# Patient Record
Sex: MALE | Race: WHITE | NOT HISPANIC OR LATINO
[De-identification: names, ages, dates, MRNs, and addresses within clinical notes are randomized per-mention and may not be internally consistent; named-entity substitution may affect disease eponyms.]

---

## 2017-07-14 ENCOUNTER — TRANSCRIPTION ENCOUNTER (OUTPATIENT)
Age: 32
End: 2017-07-14

## 2017-11-08 ENCOUNTER — TRANSCRIPTION ENCOUNTER (OUTPATIENT)
Age: 32
End: 2017-11-08

## 2019-06-17 ENCOUNTER — EMERGENCY (EMERGENCY)
Facility: HOSPITAL | Age: 34
LOS: 1 days | Discharge: HOME | End: 2019-06-17
Attending: EMERGENCY MEDICINE
Payer: COMMERCIAL

## 2019-06-17 VITALS
HEIGHT: 68 IN | HEART RATE: 81 BPM | TEMPERATURE: 98 F | SYSTOLIC BLOOD PRESSURE: 146 MMHG | RESPIRATION RATE: 19 BRPM | WEIGHT: 195.11 LBS | OXYGEN SATURATION: 98 % | DIASTOLIC BLOOD PRESSURE: 110 MMHG

## 2019-06-17 DIAGNOSIS — Z98.890 OTHER SPECIFIED POSTPROCEDURAL STATES: Chronic | ICD-10-CM

## 2019-06-17 PROCEDURE — 99283 EMERGENCY DEPT VISIT LOW MDM: CPT

## 2019-06-17 NOTE — ED ADULT NURSE NOTE - OBJECTIVE STATEMENT
pt states, "I was picking something up by the parking meter and I stepped backwards onto uneven pavement and my left foot twisted and I started to have pain in my calf and behind my knee. I went to an orthopedist and they told me to come to the ED" pt ambulatory with steady gait at present."  pt has full ROM in all extremities at present. pt denies any fall, hitting his head, LOC, chest pain, SOB, numbness/tingling at present.

## 2019-06-17 NOTE — ED ADULT TRIAGE NOTE - CHIEF COMPLAINT QUOTE
s/p trip over crack in pavement, felt pain in L lower back radiating thigh --> L calf, seen by orthopedist, sent to ED for doppler, +L calf swelling, +ambulatory

## 2019-06-17 NOTE — ED ADULT NURSE NOTE - NSIMPLEMENTINTERV_GEN_ALL_ED
Implemented All Universal Safety Interventions:  [place] to call system. Call bell, personal items and telephone within reach. Instruct patient to call for assistance. Room bathroom lighting operational. Non-slip footwear when patient is off stretcher. Physically safe environment: no spills, clutter or unnecessary equipment. Stretcher in lowest position, wheels locked, appropriate side rails in place.

## 2019-06-17 NOTE — ED PROVIDER NOTE - MUSCULOSKELETAL, MLM
No midline spinal tenderness.  Non tender over left SI joint.  Left hamdstrain and calf with mild tenderness.  joint on LLE full ROM NT throughout.  Negative Moon's test LLE.

## 2019-06-17 NOTE — ED PROVIDER NOTE - OBJECTIVE STATEMENT
34 y.o. male coming in with left posterior leg pain.  Pt was at work, lifted something heavy and stepped back with her left foot.  Heel went in the ground and he hyperextended his left foot pointing his toes to the lizzy.  Did not fall to the ground, no other injury to mention.  Initially had some pain in the LLB but that has since resolved.  Currently with pain over the left hamstring and calf.  Saw an orthopedist today that recommended he come to the ER for possible dopplers.  Pain is worse with ambulation and better with rest.

## 2019-06-17 NOTE — ED PROVIDER NOTE - NSFOLLOWUPINSTRUCTIONS_ED_ALL_ED_FT
1- Motrin 600 mg every 6 hours for pain  2- Follow up with your orthopedist and for your MRI tomorrow  3- Return to ER for any new or worsening symptoms  4- Weight bearing as tolerated  5- Rest Ice and Elevation this evening and tomorrow.

## 2019-06-17 NOTE — ED PROVIDER NOTE - NSFOLLOWUPCLINICS_GEN_ALL_ED_FT
[facility]  Sports Medicine  [address], [address]  [place] [address]  Phone: [phone]  Fax:   Follow Up Time:

## 2019-06-17 NOTE — ED ADULT NURSE REASSESSMENT NOTE - NS ED NURSE REASSESS COMMENT FT1
pt sent to be registered with rep. pt not returned to pt bed and no longer in ED. No IV in place at present. pt AOx3 VSS at present. ER JOSE Grewal made aware.

## 2020-03-21 ENCOUNTER — TRANSCRIPTION ENCOUNTER (OUTPATIENT)
Age: 35
End: 2020-03-21

## 2021-07-28 ENCOUNTER — TRANSCRIPTION ENCOUNTER (OUTPATIENT)
Age: 36
End: 2021-07-28

## 2021-09-14 ENCOUNTER — TRANSCRIPTION ENCOUNTER (OUTPATIENT)
Age: 36
End: 2021-09-14

## 2021-09-20 ENCOUNTER — TRANSCRIPTION ENCOUNTER (OUTPATIENT)
Age: 36
End: 2021-09-20

## 2021-09-21 ENCOUNTER — TRANSCRIPTION ENCOUNTER (OUTPATIENT)
Age: 36
End: 2021-09-21

## 2021-09-27 ENCOUNTER — TRANSCRIPTION ENCOUNTER (OUTPATIENT)
Age: 36
End: 2021-09-27

## 2021-09-28 ENCOUNTER — TRANSCRIPTION ENCOUNTER (OUTPATIENT)
Age: 36
End: 2021-09-28

## 2021-10-05 ENCOUNTER — TRANSCRIPTION ENCOUNTER (OUTPATIENT)
Age: 36
End: 2021-10-05

## 2021-10-12 ENCOUNTER — TRANSCRIPTION ENCOUNTER (OUTPATIENT)
Age: 36
End: 2021-10-12

## 2021-10-13 ENCOUNTER — TRANSCRIPTION ENCOUNTER (OUTPATIENT)
Age: 36
End: 2021-10-13

## 2021-10-18 ENCOUNTER — TRANSCRIPTION ENCOUNTER (OUTPATIENT)
Age: 36
End: 2021-10-18

## 2021-10-19 ENCOUNTER — TRANSCRIPTION ENCOUNTER (OUTPATIENT)
Age: 36
End: 2021-10-19

## 2021-10-26 ENCOUNTER — TRANSCRIPTION ENCOUNTER (OUTPATIENT)
Age: 36
End: 2021-10-26

## 2021-11-01 ENCOUNTER — TRANSCRIPTION ENCOUNTER (OUTPATIENT)
Age: 36
End: 2021-11-01

## 2021-11-02 ENCOUNTER — TRANSCRIPTION ENCOUNTER (OUTPATIENT)
Age: 36
End: 2021-11-02

## 2021-11-08 ENCOUNTER — TRANSCRIPTION ENCOUNTER (OUTPATIENT)
Age: 36
End: 2021-11-08

## 2021-12-12 ENCOUNTER — EMERGENCY (EMERGENCY)
Facility: HOSPITAL | Age: 36
LOS: 1 days | Discharge: HOME | End: 2021-12-12
Attending: EMERGENCY MEDICINE | Admitting: EMERGENCY MEDICINE
Payer: COMMERCIAL

## 2021-12-12 VITALS
OXYGEN SATURATION: 99 % | DIASTOLIC BLOOD PRESSURE: 77 MMHG | SYSTOLIC BLOOD PRESSURE: 128 MMHG | HEART RATE: 56 BPM | RESPIRATION RATE: 14 BRPM | TEMPERATURE: 98 F

## 2021-12-12 VITALS
HEIGHT: 68 IN | DIASTOLIC BLOOD PRESSURE: 103 MMHG | RESPIRATION RATE: 18 BRPM | SYSTOLIC BLOOD PRESSURE: 155 MMHG | OXYGEN SATURATION: 100 % | HEART RATE: 58 BPM | WEIGHT: 199.96 LBS | TEMPERATURE: 97 F

## 2021-12-12 DIAGNOSIS — Z98.890 OTHER SPECIFIED POSTPROCEDURAL STATES: Chronic | ICD-10-CM

## 2021-12-12 LAB
ALBUMIN SERPL ELPH-MCNC: 3.9 G/DL — SIGNIFICANT CHANGE UP (ref 3.3–5)
ALP SERPL-CCNC: 54 U/L — SIGNIFICANT CHANGE UP (ref 40–120)
ALT FLD-CCNC: 88 U/L — HIGH (ref 10–45)
ANION GAP SERPL CALC-SCNC: 9 MMOL/L — SIGNIFICANT CHANGE UP (ref 5–17)
APTT BLD: 35.5 SEC — SIGNIFICANT CHANGE UP (ref 27.5–35.5)
AST SERPL-CCNC: 26 U/L — SIGNIFICANT CHANGE UP (ref 10–40)
BASOPHILS # BLD AUTO: 0.03 K/UL — SIGNIFICANT CHANGE UP (ref 0–0.2)
BASOPHILS NFR BLD AUTO: 0.3 % — SIGNIFICANT CHANGE UP (ref 0–2)
BILIRUB SERPL-MCNC: 0.3 MG/DL — SIGNIFICANT CHANGE UP (ref 0.2–1.2)
BUN SERPL-MCNC: 18 MG/DL — SIGNIFICANT CHANGE UP (ref 7–23)
CALCIUM SERPL-MCNC: 8.6 MG/DL — SIGNIFICANT CHANGE UP (ref 8.4–10.5)
CHLORIDE SERPL-SCNC: 106 MMOL/L — SIGNIFICANT CHANGE UP (ref 96–108)
CO2 SERPL-SCNC: 27 MMOL/L — SIGNIFICANT CHANGE UP (ref 22–31)
CREAT SERPL-MCNC: 0.98 MG/DL — SIGNIFICANT CHANGE UP (ref 0.5–1.3)
EOSINOPHIL # BLD AUTO: 0.31 K/UL — SIGNIFICANT CHANGE UP (ref 0–0.5)
EOSINOPHIL NFR BLD AUTO: 3.6 % — SIGNIFICANT CHANGE UP (ref 0–6)
GLUCOSE SERPL-MCNC: 85 MG/DL — SIGNIFICANT CHANGE UP (ref 70–99)
HCT VFR BLD CALC: 44.8 % — SIGNIFICANT CHANGE UP (ref 39–50)
HGB BLD-MCNC: 15.5 G/DL — SIGNIFICANT CHANGE UP (ref 13–17)
IMM GRANULOCYTES NFR BLD AUTO: 0.3 % — SIGNIFICANT CHANGE UP (ref 0–1.5)
INR BLD: 1.03 RATIO — SIGNIFICANT CHANGE UP (ref 0.88–1.16)
LIDOCAIN IGE QN: 105 U/L — SIGNIFICANT CHANGE UP (ref 73–393)
LYMPHOCYTES # BLD AUTO: 4.03 K/UL — HIGH (ref 1–3.3)
LYMPHOCYTES # BLD AUTO: 46.8 % — HIGH (ref 13–44)
MCHC RBC-ENTMCNC: 31 PG — SIGNIFICANT CHANGE UP (ref 27–34)
MCHC RBC-ENTMCNC: 34.6 GM/DL — SIGNIFICANT CHANGE UP (ref 32–36)
MCV RBC AUTO: 89.6 FL — SIGNIFICANT CHANGE UP (ref 80–100)
MONOCYTES # BLD AUTO: 0.81 K/UL — SIGNIFICANT CHANGE UP (ref 0–0.9)
MONOCYTES NFR BLD AUTO: 9.4 % — SIGNIFICANT CHANGE UP (ref 2–14)
NEUTROPHILS # BLD AUTO: 3.4 K/UL — SIGNIFICANT CHANGE UP (ref 1.8–7.4)
NEUTROPHILS NFR BLD AUTO: 39.6 % — LOW (ref 43–77)
NRBC # BLD: 0 /100 WBCS — SIGNIFICANT CHANGE UP (ref 0–0)
NT-PROBNP SERPL-SCNC: <5 PG/ML — SIGNIFICANT CHANGE UP (ref 0–300)
PLATELET # BLD AUTO: 256 K/UL — SIGNIFICANT CHANGE UP (ref 150–400)
POTASSIUM SERPL-MCNC: 3.8 MMOL/L — SIGNIFICANT CHANGE UP (ref 3.5–5.3)
POTASSIUM SERPL-SCNC: 3.8 MMOL/L — SIGNIFICANT CHANGE UP (ref 3.5–5.3)
PROT SERPL-MCNC: 7 G/DL — SIGNIFICANT CHANGE UP (ref 6–8.3)
PROTHROM AB SERPL-ACNC: 12.5 SEC — SIGNIFICANT CHANGE UP (ref 10.6–13.6)
RBC # BLD: 5 M/UL — SIGNIFICANT CHANGE UP (ref 4.2–5.8)
RBC # FLD: 11.6 % — SIGNIFICANT CHANGE UP (ref 10.3–14.5)
SODIUM SERPL-SCNC: 142 MMOL/L — SIGNIFICANT CHANGE UP (ref 135–145)
TROPONIN I, HIGH SENSITIVITY RESULT: 10.3 NG/L — SIGNIFICANT CHANGE UP
TROPONIN I, HIGH SENSITIVITY RESULT: 8.7 NG/L — SIGNIFICANT CHANGE UP
WBC # BLD: 8.61 K/UL — SIGNIFICANT CHANGE UP (ref 3.8–10.5)
WBC # FLD AUTO: 8.61 K/UL — SIGNIFICANT CHANGE UP (ref 3.8–10.5)

## 2021-12-12 PROCEDURE — 93005 ELECTROCARDIOGRAM TRACING: CPT

## 2021-12-12 PROCEDURE — 71275 CT ANGIOGRAPHY CHEST: CPT | Mod: MA

## 2021-12-12 PROCEDURE — 99284 EMERGENCY DEPT VISIT MOD MDM: CPT | Mod: 25

## 2021-12-12 PROCEDURE — 83690 ASSAY OF LIPASE: CPT

## 2021-12-12 PROCEDURE — 93010 ELECTROCARDIOGRAM REPORT: CPT

## 2021-12-12 PROCEDURE — 36415 COLL VENOUS BLD VENIPUNCTURE: CPT

## 2021-12-12 PROCEDURE — 85730 THROMBOPLASTIN TIME PARTIAL: CPT

## 2021-12-12 PROCEDURE — 83880 ASSAY OF NATRIURETIC PEPTIDE: CPT

## 2021-12-12 PROCEDURE — 85025 COMPLETE CBC W/AUTO DIFF WBC: CPT

## 2021-12-12 PROCEDURE — 80053 COMPREHEN METABOLIC PANEL: CPT

## 2021-12-12 PROCEDURE — 84484 ASSAY OF TROPONIN QUANT: CPT

## 2021-12-12 PROCEDURE — 99285 EMERGENCY DEPT VISIT HI MDM: CPT

## 2021-12-12 PROCEDURE — 71275 CT ANGIOGRAPHY CHEST: CPT | Mod: 26,MA

## 2021-12-12 PROCEDURE — 85610 PROTHROMBIN TIME: CPT

## 2021-12-12 NOTE — ED PROVIDER NOTE - PATIENT PORTAL LINK FT
You can access the FollowMyHealth Patient Portal offered by [facility] by registering at the following website: [email or web address]. By joining [employer]’s FollowMyHealth portal, you will also be able to view your health information using other applications (apps) compatible with our system.

## 2021-12-12 NOTE — ED ADULT TRIAGE NOTE - CHIEF COMPLAINT QUOTE
Pt c/o waking up with sob, left arm pain, and chest tightness x40 min ago. Pt takes Xarelto for left leg DVT

## 2021-12-12 NOTE — ED PROVIDER NOTE - PROGRESS NOTE DETAILS
Dr. Camp: Recd sign out from Dr. Dietrich, pt asx, well appearing, CTA neg - results explained to pt. Will f/u 2nd trop. Pt to f/u with his cardiologist Dr. Pham.  Pt inquiring about f/u DVT to investigate resolution of DVT - advised pt to f/u with PMD about it.

## 2021-12-12 NOTE — ED PROVIDER NOTE - OBJECTIVE STATEMENT
36M PMH DVT on Xarelto presents to the ED c/o SOB. Pt states he first felt SOB a few days ago. He states it resolved but this morning, he awoke feeling the same. He describes feeling like the wind is knocked out of him and it is hard to get a deep breath. He has some pain with inspiration.   PMH DVT from 2 years ago and HTN on Ramipril. He endorses noncompliance with Ramipril but takes his Xarelto as prescribed. He notes that he had a DVT from a traumatic event that occurred at work (unclear if he had a heme workup)- still on Xarelto 2 years later.     Recent travel : Car drive 5 hrs to [place] and another 5 hrs to [place] and the first episode of SOB occurred while out of state. Return car drive back to [place] and then back to [place] on Tuesday for same duration. He HAD leg swelling of the left leg that was minimal and only noted when the imprint of his sock was more noticeable on the left foot. That has since resolved.     Pt is now anxious. He is expecting a baby in 2 months. He just bought a house and got  few months ago. He has not been exercising. He is fearful of the chief complaint and potential diagnosis. He has a history of drug abuse (opioids) and has been clean for 1 year.

## 2021-12-12 NOTE — ED PROVIDER NOTE - NSFOLLOWUPCLINICS_GEN_ALL_ED_FT
[facility]  Family Medicine  [address] [place] [id number]  Phone: [phone]  Fax:

## 2021-12-12 NOTE — ED ADULT NURSE NOTE - OBJECTIVE STATEMENT
Pt presents to the ED with c/o left arm pain, chest tightness, and sob 40 minutes pta. PT states he had a 5 hour car road trip about 6 days ago. Pt also states he takes Xarelto for a left leg DVT.

## 2021-12-12 NOTE — ED PROVIDER NOTE - CLINICAL SUMMARY MEDICAL DECISION MAKING FREE TEXT BOX
36M PMH DVT on Xarelto presents to the ED c/o SOB. Pt states he first felt SOB a few days ago. He states it resolved but this morning, he awoke feeling the same. He describes feeling like the wind is knocked out of him and it is hard to get a deep breath. He has some pain with inspiration.   PMH DVT from 2 years ago and HTN on Ramipril. He endorses noncompliance with Ramipril but takes his Xarelto as prescribed. He notes that he had a DVT from a traumatic event that occurred at work (unclear if he had a heme workup)- still on Xarelto 2 years later.     Recent travel : Car drive 5 hrs to [place] and another 5 hrs to [place] and the first episode of SOB occurred while out of state. Return car drive back to [place] and then back to [place] on Tuesday for same duration. He HAD leg swelling of the left leg that was minimal and only noted when the imprint of his sock was more noticeable on the left foot. That has since resolved.     Pt is now anxious. He is expecting a baby in 2 months. He just bought a house and got  few months ago. He has not been exercising. He is fearful of the chief complaint and potential diagnosis. He has a history of drug abuse (opioids) and has been clean for 1 year.     Exam as stated. Plan for lab ekg and CT angio to r/o PE. Pt is high risk.     Patient signed out to incoming physician.  All decisions regarding the progression of care will be made at their discretion.

## 2022-01-07 ENCOUNTER — APPOINTMENT (OUTPATIENT)
Dept: FAMILY MEDICINE | Facility: CLINIC | Age: 37
End: 2022-01-07
Payer: COMMERCIAL

## 2022-01-07 ENCOUNTER — NON-APPOINTMENT (OUTPATIENT)
Age: 37
End: 2022-01-07

## 2022-01-07 VITALS — HEIGHT: 68 IN | BODY MASS INDEX: 30.26 KG/M2

## 2022-01-07 VITALS
SYSTOLIC BLOOD PRESSURE: 119 MMHG | DIASTOLIC BLOOD PRESSURE: 76 MMHG | HEART RATE: 77 BPM | WEIGHT: 199 LBS | TEMPERATURE: 97.4 F | OXYGEN SATURATION: 99 % | RESPIRATION RATE: 14 BRPM

## 2022-01-07 DIAGNOSIS — F11.21 OPIOID DEPENDENCE, IN REMISSION: ICD-10-CM

## 2022-01-07 DIAGNOSIS — Z23 ENCOUNTER FOR IMMUNIZATION: ICD-10-CM

## 2022-01-07 PROCEDURE — 99385 PREV VISIT NEW AGE 18-39: CPT | Mod: 25

## 2022-01-07 PROCEDURE — 90715 TDAP VACCINE 7 YRS/> IM: CPT

## 2022-01-07 PROCEDURE — 90471 IMMUNIZATION ADMIN: CPT

## 2022-01-07 NOTE — HISTORY OF PRESENT ILLNESS
[FreeTextEntry1] : CPE, new patient  [de-identified] : 37 yo male with h/o DVT, opioid use disorder in remission, hyperlipidemia, sleep apnea (not using CPAP- did sleep study a couple of months ago ) presents for CPE and to establish care. He lives with his wife and is expecting a baby in Feb 2022. Works for [employer].\par \par DVT: June 2018, has been treated by cardiology. Was hurt at work and developed blood clot on the left. No family history. States he had an hypercoagulation workup. Leg is stable. Has been on xarelto for 2.5 years\par Went to Cardiology 2 days ago and got stress test, EKG,echo. Cardiologist is Dr. Pham. \par \par 18 months sober from opioid use disorder\par \par \par

## 2022-01-07 NOTE — ASSESSMENT
[FreeTextEntry1] : Patient to request records from cardiology be faxed to our office. Number provided. \par Blood work requisition provided. Will follow up with results.\par Tdap administered today

## 2022-01-07 NOTE — HEALTH RISK ASSESSMENT
[Never] : Never [1 or 2 (0 pts)] : 1 or 2 (0 points) [Never (0 pts)] : Never (0 points) [No] : In the past 12 months have you used drugs other than those required for medical reasons? No [0] : 2) Feeling down, depressed, or hopeless: Not at all (0) [PHQ-2 Negative - No further assessment needed] : PHQ-2 Negative - No further assessment needed [None] : None [With Significant Other] : lives with significant other [Employed] : employed [] :  [Fully functional (bathing, dressing, toileting, transferring, walking, feeding)] : Fully functional (bathing, dressing, toileting, transferring, walking, feeding) [Fully functional (using the telephone, shopping, preparing meals, housekeeping, doing laundry, using] : Fully functional and needs no help or supervision to perform IADLs (using the telephone, shopping, preparing meals, housekeeping, doing laundry, using transportation, managing medications and managing finances) [With Patient/Caregiver] : , with patient/caregiver [Designated Healthcare Proxy] : Designated healthcare proxy [Name: ___] : Health Care Proxy's Name: [unfilled]  [Relationship: ___] : Relationship: [unfilled] [Good] : ~his/her~  mood as  good [de-identified] : Cardiology [[id number]] : 0 [FreeTextEntry2] : 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

## 2022-01-18 LAB
ALBUMIN SERPL ELPH-MCNC: 4.6 G/DL
ALP BLD-CCNC: 52 U/L
ALT SERPL-CCNC: 44 U/L
ANION GAP SERPL CALC-SCNC: 10 MMOL/L
AST SERPL-CCNC: 25 U/L
BASOPHILS # BLD AUTO: 0.02 K/UL
BASOPHILS NFR BLD AUTO: 0.3 %
BILIRUB SERPL-MCNC: 0.5 MG/DL
BUN SERPL-MCNC: 11 MG/DL
CALCIUM SERPL-MCNC: 9.2 MG/DL
CHLORIDE SERPL-SCNC: 106 MMOL/L
CHOLEST SERPL-MCNC: 137 MG/DL
CO2 SERPL-SCNC: 25 MMOL/L
CREAT SERPL-MCNC: 0.96 MG/DL
EOSINOPHIL # BLD AUTO: 0.19 K/UL
EOSINOPHIL NFR BLD AUTO: 3.3 %
ESTIMATED AVERAGE GLUCOSE: 108 MG/DL
GLUCOSE SERPL-MCNC: 81 MG/DL
HBA1C MFR BLD HPLC: 5.4 %
HCT VFR BLD CALC: 45.9 %
HDLC SERPL-MCNC: 44 MG/DL
HGB BLD-MCNC: 15.2 G/DL
IMM GRANULOCYTES NFR BLD AUTO: 0.2 %
LDLC SERPL CALC-MCNC: 82 MG/DL
LYMPHOCYTES # BLD AUTO: 2.27 K/UL
LYMPHOCYTES NFR BLD AUTO: 39.5 %
MAN DIFF?: NORMAL
MCHC RBC-ENTMCNC: 29.3 PG
MCHC RBC-ENTMCNC: 33.1 GM/DL
MCV RBC AUTO: 88.4 FL
MONOCYTES # BLD AUTO: 0.52 K/UL
MONOCYTES NFR BLD AUTO: 9 %
NEUTROPHILS # BLD AUTO: 2.74 K/UL
NEUTROPHILS NFR BLD AUTO: 47.7 %
NONHDLC SERPL-MCNC: 93 MG/DL
PLATELET # BLD AUTO: 259 K/UL
POTASSIUM SERPL-SCNC: 4.9 MMOL/L
PROT SERPL-MCNC: 6.6 G/DL
RBC # BLD: 5.19 M/UL
RBC # FLD: 11.5 %
SODIUM SERPL-SCNC: 142 MMOL/L
TRIGL SERPL-MCNC: 51 MG/DL
WBC # FLD AUTO: 5.75 K/UL

## 2022-06-21 DIAGNOSIS — Z86.39 PERSONAL HISTORY OF OTHER ENDOCRINE, NUTRITIONAL AND METABOLIC DISEASE: ICD-10-CM

## 2022-06-21 PROBLEM — I82.409 ACUTE EMBOLISM AND THROMBOSIS OF UNSPECIFIED DEEP VEINS OF UNSPECIFIED LOWER EXTREMITY: Chronic | Status: ACTIVE | Noted: 2021-12-12

## 2022-07-08 ENCOUNTER — APPOINTMENT (OUTPATIENT)
Dept: FAMILY MEDICINE | Facility: CLINIC | Age: 37
End: 2022-07-08

## 2022-10-30 ENCOUNTER — NON-APPOINTMENT (OUTPATIENT)
Age: 37
End: 2022-10-30

## 2022-10-31 ENCOUNTER — TRANSCRIPTION ENCOUNTER (OUTPATIENT)
Age: 37
End: 2022-10-31

## 2022-11-15 ENCOUNTER — NON-APPOINTMENT (OUTPATIENT)
Age: 37
End: 2022-11-15

## 2022-11-23 ENCOUNTER — APPOINTMENT (OUTPATIENT)
Dept: FAMILY MEDICINE | Facility: CLINIC | Age: 37
End: 2022-11-23

## 2022-11-23 VITALS
OXYGEN SATURATION: 97 % | BODY MASS INDEX: 27.07 KG/M2 | DIASTOLIC BLOOD PRESSURE: 82 MMHG | HEART RATE: 64 BPM | WEIGHT: 178 LBS | TEMPERATURE: 97.5 F | RESPIRATION RATE: 16 BRPM | SYSTOLIC BLOOD PRESSURE: 118 MMHG

## 2022-11-23 DIAGNOSIS — G47.33 OBSTRUCTIVE SLEEP APNEA (ADULT) (PEDIATRIC): ICD-10-CM

## 2022-11-23 PROCEDURE — 99213 OFFICE O/P EST LOW 20 MIN: CPT

## 2022-11-23 NOTE — PHYSICAL EXAM
[Normal] : normal rate, regular rhythm, normal S1 and S2 and no murmur heard [No Joint Swelling] : no joint swelling [de-identified] : calf circumference right: 39.5cm left 40.5cm, no erythema, swelling or ttp

## 2022-11-23 NOTE — HISTORY OF PRESENT ILLNESS
[FreeTextEntry1] : Follow up history of DVT  [de-identified] : 38 yo male with h/o DVT, opioid use disorder in remission, hyperlipidemia, sleep apnea presents for follow up. He had previously been following with cardiology who was doing regularly dopplers in the office. The cardiology follow ups became more infrequent. He prefers to not go back to the cardiologist if he doesn't have to.  \par \par Sees Dr. Pham, cardiologist on [facility] (next visit not yet scheduled). Last visit and doppler was approximately 8 months ago.  \par He lives with his wife and 8 month old son who is doing well.

## 2022-12-05 ENCOUNTER — APPOINTMENT (OUTPATIENT)
Dept: ULTRASOUND IMAGING | Facility: IMAGING CENTER | Age: 37
End: 2022-12-05

## 2022-12-05 ENCOUNTER — OUTPATIENT (OUTPATIENT)
Dept: OUTPATIENT SERVICES | Facility: HOSPITAL | Age: 37
LOS: 1 days | End: 2022-12-05
Payer: COMMERCIAL

## 2022-12-05 DIAGNOSIS — Z98.890 OTHER SPECIFIED POSTPROCEDURAL STATES: Chronic | ICD-10-CM

## 2022-12-05 DIAGNOSIS — Z86.718 PERSONAL HISTORY OF OTHER VENOUS THROMBOSIS AND EMBOLISM: ICD-10-CM

## 2022-12-05 PROCEDURE — 93971 EXTREMITY STUDY: CPT | Mod: 26,LT

## 2022-12-05 PROCEDURE — 93971 EXTREMITY STUDY: CPT

## 2022-12-06 ENCOUNTER — NON-APPOINTMENT (OUTPATIENT)
Age: 37
End: 2022-12-06

## 2022-12-06 RX ORDER — RIVAROXABAN 10 MG/1
10 TABLET, FILM COATED ORAL
Refills: 0 | Status: DISCONTINUED | COMMUNITY
Start: 2022-01-07 | End: 2022-12-06

## 2022-12-07 ENCOUNTER — NON-APPOINTMENT (OUTPATIENT)
Age: 37
End: 2022-12-07

## 2022-12-07 LAB
CHOLEST SERPL-MCNC: 213 MG/DL
DEPRECATED D DIMER PPP IA-ACNC: 231 NG/ML DDU
ESTIMATED AVERAGE GLUCOSE: 105 MG/DL
HBA1C MFR BLD HPLC: 5.3 %
HDLC SERPL-MCNC: 50 MG/DL
LDLC SERPL CALC-MCNC: 129 MG/DL
NONHDLC SERPL-MCNC: 163 MG/DL
TRIGL SERPL-MCNC: 172 MG/DL

## 2023-03-02 NOTE — ED PROVIDER NOTE - ATTENDING CONTRIBUTION TO CARE
Patient/Caregiver requests family/friend to interpret.
34 y.o. male coming in with left posterior/lateral leg pain after falling back into a small ditch with no hip pain, ambulating with lateral calf pain likely strain, no need for imaging, analgesia and ortho follow up.

## 2023-05-22 LAB
ALBUMIN SERPL ELPH-MCNC: 4.9 G/DL
ALP BLD-CCNC: 53 U/L
ALT SERPL-CCNC: 31 U/L
ANION GAP SERPL CALC-SCNC: 12 MMOL/L
AST SERPL-CCNC: 28 U/L
BILIRUB SERPL-MCNC: 0.7 MG/DL
BUN SERPL-MCNC: 15 MG/DL
CALCIUM SERPL-MCNC: 9.7 MG/DL
CHLORIDE SERPL-SCNC: 106 MMOL/L
CHOLEST SERPL-MCNC: 220 MG/DL
CO2 SERPL-SCNC: 24 MMOL/L
CREAT SERPL-MCNC: 0.99 MG/DL
EGFR: 99 ML/MIN/1.73M2
ESTIMATED AVERAGE GLUCOSE: 108 MG/DL
GLUCOSE SERPL-MCNC: 90 MG/DL
HBA1C MFR BLD HPLC: 5.4 %
HDLC SERPL-MCNC: 62 MG/DL
LDLC SERPL CALC-MCNC: 150 MG/DL
NONHDLC SERPL-MCNC: 158 MG/DL
POTASSIUM SERPL-SCNC: 4.6 MMOL/L
PROT SERPL-MCNC: 6.8 G/DL
SODIUM SERPL-SCNC: 142 MMOL/L
TRIGL SERPL-MCNC: 39 MG/DL

## 2023-06-14 ENCOUNTER — APPOINTMENT (OUTPATIENT)
Dept: FAMILY MEDICINE | Facility: CLINIC | Age: 38
End: 2023-06-14
Payer: COMMERCIAL

## 2023-06-14 VITALS
OXYGEN SATURATION: 98 % | BODY MASS INDEX: 27.89 KG/M2 | WEIGHT: 184 LBS | HEART RATE: 62 BPM | RESPIRATION RATE: 16 BRPM | SYSTOLIC BLOOD PRESSURE: 108 MMHG | DIASTOLIC BLOOD PRESSURE: 71 MMHG | TEMPERATURE: 97.9 F | HEIGHT: 68 IN

## 2023-06-14 DIAGNOSIS — Z86.718 PERSONAL HISTORY OF OTHER VENOUS THROMBOSIS AND EMBOLISM: ICD-10-CM

## 2023-06-14 DIAGNOSIS — Z00.00 ENCOUNTER FOR GENERAL ADULT MEDICAL EXAMINATION W/OUT ABNORMAL FINDINGS: ICD-10-CM

## 2023-06-14 DIAGNOSIS — E78.5 HYPERLIPIDEMIA, UNSPECIFIED: ICD-10-CM

## 2023-06-14 PROCEDURE — 99396 PREV VISIT EST AGE 40-64: CPT

## 2023-06-14 RX ORDER — ROSUVASTATIN CALCIUM 10 MG/1
10 TABLET, FILM COATED ORAL
Qty: 30 | Refills: 2 | Status: DISCONTINUED | COMMUNITY
Start: 2022-01-07 | End: 2023-06-14

## 2023-06-14 NOTE — HISTORY OF PRESENT ILLNESS
[FreeTextEntry1] : CPE [de-identified] : 37 yo male with h/o DVT, opioid use disorder in remission, hyperlipidemia, sleep apnea presents for CPE. He is feeling well today. Was promoted at work. Lives at home with wife and 14 month old son.  Will occasionally get a calf cramp in the morning. Will occasionally get headaches when eating more sugar or if eating too later in the day. Working out a couple of times per week.

## 2023-06-14 NOTE — HEALTH RISK ASSESSMENT
[With Patient/Caregiver] : , with patient/caregiver [Name: ___] : Health Care Proxy's Name: [unfilled]  [Relationship: ___] : Relationship: [unfilled] [Good] : ~his/her~  mood as  good [Yes] : Yes [1 or 2 (0 pts)] : 1 or 2 (0 points) [Never (0 pts)] : Never (0 points) [No falls in past year] : Patient reported no falls in the past year [0] : 2) Feeling down, depressed, or hopeless: Not at all (0) [PHQ-2 Negative - No further assessment needed] : PHQ-2 Negative - No further assessment needed [None] : None [With Family] : lives with family [] :  [# Of Children ___] : has [unfilled] children [Fully functional (bathing, dressing, toileting, transferring, walking, feeding)] : Fully functional (bathing, dressing, toileting, transferring, walking, feeding) [Fully functional (using the telephone, shopping, preparing meals, housekeeping, doing laundry, using] : Fully functional and needs no help or supervision to perform IADLs (using the telephone, shopping, preparing meals, housekeeping, doing laundry, using transportation, managing medications and managing finances) [Audit-CScore] : 0 [[id number]] : 0 [Reports changes in hearing] : Reports no changes in hearing [Reports changes in vision] : Reports no changes in vision [Reports changes in dental health] : Reports no changes in dental health [AdvancecareDate] : 06/14/2023

## 2023-12-15 ENCOUNTER — NON-APPOINTMENT (OUTPATIENT)
Age: 38
End: 2023-12-15

## 2024-01-02 ENCOUNTER — EMERGENCY (EMERGENCY)
Facility: HOSPITAL | Age: 39
LOS: 1 days | Discharge: HOME | End: 2024-01-02
Attending: INTERNAL MEDICINE | Admitting: EMERGENCY MEDICINE
Payer: COMMERCIAL

## 2024-01-02 VITALS
RESPIRATION RATE: 18 BRPM | SYSTOLIC BLOOD PRESSURE: 122 MMHG | DIASTOLIC BLOOD PRESSURE: 82 MMHG | HEART RATE: 60 BPM | OXYGEN SATURATION: 98 %

## 2024-01-02 VITALS
HEART RATE: 56 BPM | OXYGEN SATURATION: 99 % | WEIGHT: 179.9 LBS | SYSTOLIC BLOOD PRESSURE: 134 MMHG | DIASTOLIC BLOOD PRESSURE: 73 MMHG | RESPIRATION RATE: 18 BRPM | TEMPERATURE: 98 F

## 2024-01-02 DIAGNOSIS — Z98.890 OTHER SPECIFIED POSTPROCEDURAL STATES: Chronic | ICD-10-CM

## 2024-01-02 PROCEDURE — 99283 EMERGENCY DEPT VISIT LOW MDM: CPT

## 2024-01-02 PROCEDURE — 12002 RPR S/N/AX/GEN/TRNK2.6-7.5CM: CPT

## 2024-01-02 PROCEDURE — 99284 EMERGENCY DEPT VISIT MOD MDM: CPT

## 2024-01-02 RX ORDER — OXYCODONE AND ACETAMINOPHEN 5; 325 MG/1; MG/1
1 TABLET ORAL
Qty: 8 | Refills: 0
Start: 2024-01-02 | End: 2024-01-03

## 2024-01-02 RX ORDER — ACETAMINOPHEN 500 MG
975 TABLET ORAL ONCE
Refills: 0 | Status: COMPLETED | OUTPATIENT
Start: 2024-01-02 | End: 2024-01-02

## 2024-01-02 RX ADMIN — Medication 975 MILLIGRAM(S): at 19:49

## 2024-01-02 RX ADMIN — Medication 450 MILLIGRAM(S): at 19:50

## 2024-01-02 RX ADMIN — Medication 975 MILLIGRAM(S): at 20:36

## 2024-01-02 NOTE — ED PROVIDER NOTE - CARE PROVIDERS DIRECT ADDRESSES
[email or web address]

## 2024-01-02 NOTE — ED PROVIDER NOTE - OBJECTIVE STATEMENT
38-year-old male with no significant past medical history came to the emergency room with chief complaint of laceration in the left knee over the patella cut with a  it was a new  accidentally

## 2024-01-02 NOTE — ED PROVIDER NOTE - NSTIMEPROVIDERCAREINITIATE_GEN_ER
PALLIATIVE CARE OUTPATIENT ENCOUNTER    Date: 06/29/20     CC: oral burning with Tyvaso    HPI  80 year old female with history of pulmonary arterial hypertension (idiopathic) on Tyvaso + tadalafil + furosemide (did not tolerate Orenitram); CLL (follows with hematology).  She initiated Tyvaso therapy last month and has been experiencing some side effects including sore throat/burning, and some difficulty with swallowing immediately following her doses.  This is her first encounter with palliative care, which was requested in light of her poor tolerance of therapies overall.      She presents today with her son Paras, stating that things are going fairly well at home.  She hasn't been getting out of the house much, which she attributes more to COVID-19 than to her functional status.  States that if she were to go to the grocery store (which she hasn't had to lately), she would be able to do her shopping without SOB.  She can still drive, but often doesn't unless she doesn't have an alternative means of transport.  Overall, she is satisfied with her QOL.    ROS  See HPI     ALLERGIES:   Allergen Reactions   • Codeine Camsylate HIVES and RASH       MEDICATIONS  Reviewed in Epic:  Current Outpatient Medications   Medication Sig   • allopurinol (ZYLOPRIM) 100 MG tablet Take 2 tablets by mouth as directed. Take two 100mg tablets(=200mg) daily.   • potassium chloride (KLOR-CON) 10 MEQ ER tablet Take 1 tablet by mouth 2 times daily.   • simvastatin (ZOCOR) 10 MG tablet Take 1 tablet by mouth nightly.   • carvedilol (COREG) 6.25 MG tablet Take 6.25 mg by mouth as directed. 1/2 tablet twice a day   • treprostinil (TYVASO STARTER) 0.6 MG/ML Solution Inhale 3 puffs into the lungs four times daily.   • treprostinil (TYVASO REFILL) 0.6 MG/ML Solution Inhale 3 puffs into the lungs four times daily. (Patient taking differently: Inhale 4 puffs into the lungs four times daily. )   • pantoprazole (PROTONIX) 40 MG tablet Take 1  tablet by mouth 2 times daily.   • Tadalafil, PAH, 20 MG Tab Take 40 mg by mouth daily.   • warfarin (COUMADIN) 5 MG tablet Take by mouth as directed. Take one 5mg tablet on Tuesday, Thursday, and Saturday.  Take one 5mg tablet and ½(=7.5mg) on Monday Wednesday, Friday, and Sunday.   • folic acid (FOLATE) 1 MG tablet TAKE 1 TABLET BY MOUTH DAILY   • sulfamethoxazole-trimethoprim (BACTRIM SS) 400-80 MG per tablet Take 1 tablet by mouth daily.   • prochlorperazine (COMPAZINE) 10 MG tablet Take 1 tablet by mouth every 6 hours as needed for Nausea or Vomiting.   • furosemide (LASIX) 40 MG tablet Take 1 tablet by mouth daily.   • oxygen (O2) gas Inhale 1 L/min into the lungs continuous. 1 - 2 L continuous   • acetaminophen (TYLENOL) 500 MG tablet Take 500-1,000 mg by mouth every 4 hours as needed.   • Calcium Citrate-Vitamin D 500-500 MG-UNT/5GM Powder Take 1 tablet by mouth daily.   • ferrous sulfate 325 (65 FE) MG tablet Take 325 mg by mouth every other day.   • Multiple Vitamin (MULTI VITAMIN PO) Take 1 tablet by mouth daily.     No current facility-administered medications for this visit.         PAST MEDICAL/SURGICAL HISTORY, SOCIAL HISTORY, FAMILY HISTORY  Key elements summarized in Palliative Care Assessment.    OBJECTIVE  There were no vitals taken for this visit.    PHYSICAL EXAM:   GEN:  Sitting upright in clinic chair, wheelchair sitting outside her room indicates she did not walk through the clinic this date, appears younger than stated age, in no acute distress.   MS:Alert and oriented x3, with fluent speech. Maintains good eye contact, answers all questions appropriately, affect full and appropriate to time and situation.   EYES: No icterus or conjunctivitis. Vision intact. PERRL.   HENT: Normocephalic, atraumatic. Dentition good. Hearing intact.  PUL:  Auscultation deferred; unlabored respiratory effort.  O2 via nasal cannula.  SKIN: Warm and dry, no pallor or jaundice. No rash, abrasions, or lacerations  noted.   NEURO: No obvious focal deficits. PROCTOR. Cooperative with exam.     LABS:  Reviewed in Epic and notable for:  Sodium (mmol/L)   Date Value   06/15/2020 143   01/06/2020 142     Potassium (mmol/L)   Date Value   06/15/2020 4.1   01/06/2020 4.3     Chloride (mmol/L)   Date Value   06/15/2020 106   01/06/2020 105     Glucose (mg/dL)   Date Value   06/15/2020 123 (H)   01/06/2020 138 (H)     CALCIUM (mg/dL)   Date Value   01/06/2020 8.8     Calcium (mg/dL)   Date Value   06/15/2020 9.0     Carbon Dioxide (mmol/L)   Date Value   06/15/2020 28   01/06/2020 26     BUN (mg/dL)   Date Value   06/15/2020 39 (H)   01/06/2020 23 (H)     Creatinine (mg/dL)   Date Value   06/15/2020 1.02 (H)   01/06/2020 1.22 (H)     WBC (K/mcL)   Date Value   06/15/2020 4.9   01/06/2020 49.4 (H)     RBC (mil/mcL)   Date Value   06/15/2020 3.75 (L)   01/06/2020 4.09     HCT (%)   Date Value   06/15/2020 37.6   01/06/2020 41.0     HGB (g/dL)   Date Value   06/15/2020 12.6   01/06/2020 13.6     PLT   Date Value   06/15/2020 120 K/mcL (L)   01/06/2020 164 K/mcL   12/16/2013 112 K/mcL (L)   07/27/2012 183 K/uL     Albumin (g/dL)   Date Value   06/15/2020 3.6   01/06/2020 3.8         IMAGING/PROCEDURES  Reviewed in Epic and notable for:  5/27/2020 ECHO:  F/U Pulmonary Hypertension  Severe pulmonary hypertension, PASP 86 mmHg.  Severely increased right ventricular size with severely decreased radial function, FAC 20 %.  Decreased RV longitudinal function, TAPSE 15 mm. Reduced RV longitudinal strain -15 %.  Tricuspid annulus dilatation. Moderate TR.  Small posterior pericardial effusion, non hemodynamically significant.  Compared to the previous study (01/23/2020), the PASP decreased from 102 mmHg (RAP 8) to 86 mmHg (RAP 3). No change to  pericardial effusion.     5/27/2020 6MWD:          2/7/2020 RHC:  HEMODYNAMIC DATA  BP= 149/96 (118) mmHg       HR= 80      AO sat= 96%  RA= 5 mmHg.  RV= 80/8 mmHg.  PAP= 80/26 (45)  mmHg.                          PA sat= 55.2%  PCWP= 11 mmHg.  TPG= 34 mmHg  PVR 10.3 Aleman (TD) & 14 Aleman (F)  SVR 2739 dynes/cm.sec.5  Thermodilution Cardiac output (L/min)/cardiac index (L/min/m2)= 3.3/1.9.  Jeremy Cardiac output (L/min)/cardiac index (L/min/m2)= 2.4/1.4.     1/23/2020 ECHO:  F/U Pulmonary Hypertension  Severe pulmonary hypertension, PASP = 102 mmHg.  Severely increased RV size with severely decreased radial function. FAC = 24%.  Decreased RV longitudinal systolic function. TAPSE = 16 mm.  RV free wall longitudinal strain = (-11%).  Moderate tricuspid valve regurgitation.  Small posterior pericardial effusion.  PASP has increased since previous study (12.18.19), from 66 mmHg (RAP 3) to 102 mmHg (RAP 8).     1/23/2020 VQ scan:  IMPRESSION: Low probability for pulmonary embolism.     12/19/2019 CT Angiogram Chest PE:  IMPRESSION:  No evidence for pulmonary embolism or other active lung disease.  Small-to-moderate pericardial effusion.  Extensive lymphadenopathy in bilateral axillary areas, thoracic inlet, and  upper abdomen and retroperitoneum consistent with the patient's diagnosis  of lymphoma/leukemia.  Hiatal hernia.  Calcified right hilar lymph nodes and calcified granuloma in the spleen  indicate exposure to previous granulomatous disease.     4/22/2019 6 min walk (OSH):  DATE OF STUDY    4/22/19    Six minute walk test:   The patient was asked to walk unrestricted for six minutes while breathing   on room air .  Total distance covered was 195 meter. Pulse oximetry at onset was  92% ,   and at completion of study was 89 %.  Heart rate at onset was 66 /min and at completion was 110 /min  Please correlate clinically.    PALLIATIVE CARE ASSESSMENT, BY DOMAIN  Medical   Primary Dx: pulmonary arterial hypertension    Secondary Dx: RV dysfunction, home O2, poor activity tolerance    Co-morbidities:  CLL    Symptoms:  Pain: absent.  Patient denies pain.  Dyspnea: present, with satisfactory control    Other  Symptoms: None      Functional status: Baseline  Palliative Performance Scale:  50 (mainly sits or lies down, unable to do most activity, extensive disease. Needs considerable assistance. Normal or reduced intake. Fully conscious or confused.)    Prognosis (quality & quantity): likely measured in terms of months to short years at best, given the severity of her disease as well as her poor tolerance of medical management thus far.  If she is unable to continue with Tyvaso, her treatment options are limited, and may involve a \"lesser\" strategy in terms of potency/effectiveness.  Basis for estimate: chart review, clinical assessment and discussion with medical team  High risk of death within one year? yes     Psychosocial                Spiritual           Cultural Current living situation: Lives alone in a condo which she's owned for 13 years.  Her family is all local. Born and raised in [place] but has been living in [place] for 65+ years.        Family & support system: 4 children all live locally and are widely available to help. Two of her sons came to the appointment with her today but only one was allowed into the building due to current visitor restrictions related to COVID-19.         Education/occupation history: not discussed                Activities of interest:  In other social climates, she enjoys going to [employer] and the grocery store, mostly as a means of getting out of the house from time to time.  In the summer, she enjoys going to the farmer's markets and running miscellaneous errands around town.  Likes going to the [employer] and going fishing, but hasn't done either since last year.  For the last 3 months or so she hasn't left her condo much, even to visit her family.  She is not particularly bothered by this.                             Spiritual history: Her Protestant community is important to her.    Cultural factors/Other concerns: very close with her family.  She lost her  2 years ago.   Her son describes the medical scenario as something related to an aspiration pneumonia.  He was intubated and sedated for some time before the family elected to withdraw those supports in favor of comfort care.  Piper wishes that her children wouldn't have had to see that - and she doesn't want the same for herself, so she has already elected to have a community DNR order (signed earlier this month).     Transitions Continuity health care providers: Gloria Maldonado MD (PCP); Dr. Linn (primary cardiology), Dr. Morejon (heme/onc), Dr. White (pulmonary)    Home care agency: none   Ethical, Legal Decision-making capacity: decisional.  Advanced Care Planning Document: available on EPIC, signed 11/21/16  [facility]: Jos Ly.  Code Status Preferences:not specified.  Does current code status align with advance directive? N/A     COUNSELING AND CARE COORDINATION  PLAN OF CARE Discussed with patient, family, Dr. Smith, RN coordinator [place].   DISCUSSION Goals/Discussion:   Patient and family have a good sense of her goals of care in terms of a quality of life standpoint.  Though she is relatively sedentary, she is not particularly bothered by this, and because she is satisfied so is her family.  Further, she has a good sense of her limits, given what she saw her  go through at end-of-life.  She has already had conversations with her heme/onc team regarding the use of life support and as a result she already has a community DNR order in place.  She is wearing her plastic bracelet today but notes that the ink on it is already beginning to bleed from her bathing.  We discussed how to obtain a metal bracelet and she was given the order form for doing so.  Reiterated that though she may not want resuscitation, and though her family may be very clear in their understanding of that concept, that the only clear way to legally communicate that to a rescue squad would be to wear the bracelet consistently; if she removes  it and something happens to her she risks that her wishes may not be obeyed.      Regarding her PH: she met with Dr. Smith immediately prior to my visit with her and together they established a plan to continue Tyvaso for another 2 months if she can tolerate it.  She may find increasing benefit that outweighs her side effects, and follow up imaging can be performed at that time to monitor whether she's had any success with this medication.    Throughout our entire visit it appeared that Piper and her son both understood her medical condition quite well, until the last few minutes in which she stated that \"someone once told me I'd be on these meds for the rest of my life\" and asked if that was true.  We of course addressed this issue, and that pulmonary hypertension is a disease in which treatment is focused on \"management\" rather than \"cure\".  Reiterated that Tyvaso is aimed at controlling but not ultimately fixing the issue, and that writer's role is to assist in managing the balance between symptom relief and side effect burden.  Also reinforced that because Tyvaso is something she may be on for the rest of her life that she needs to be able to tolerate it or it won't meet her goals effectively.  If the side effect burden proves to be too great, we could consider alternative therapies, but they may have a \"lesser\" effect - sometimes for the sake of overall quality of life people are willing to accept that.  We will continue to address these conversations moving forward.       RECOMMENDATIONS:  • Palliative involvement: rapport established; basic education regarding pulmonary hypertension and management was provided  • Plan of care: continue with Tyvaso until next appointment in 2 months, perform imaging at that time, and address whether to continue indefinitely  • POA: already completed/on file  • Code status: already a community DNR; advised how to obtain metal bracelet should she wish to have one.  • Follow  up: 2 months with PATRICK Sharma NP       Total face to face encounter time: >45 minutes, with the majority of this time spent counseling and coordinating care.        02-Jan-2024 18:42

## 2024-01-02 NOTE — ED PROVIDER NOTE - NSFOLLOWUPINSTRUCTIONS_ED_ALL_ED_FT
Follow up with your PMD within 1-2 days.  Rest, increase your fluids, advance your activity as tolerated.   Take all of your other medications as previously prescribed.   Worsening, continued or ANY new concerning symptoms return to the emergency department.  Wound check in 2 days suture removal in 10 days Patient recommended to follow-up with orthopedic

## 2024-01-02 NOTE — ED PROVIDER NOTE - PHYSICAL EXAMINATION
General:     NAD, well-nourished, well-appearing  Head:     NC/AT, EOMI, oral mucosa moist  Neck:     trachea midline  Lungs:     CTA b/l, no w/r/r  CVS:     S1S2, RRR, no m/g/r  Abd:     +BS, s/nt/nd, no organomegaly  Ext:    2+ radial and pedal pulses, no c/c/e  6 cm laceration of the left lateral for the patella and lateral knee Actively bleeding venous bleed  Neuro: AAOx3, no sensory/motor deficits

## 2024-01-02 NOTE — ED PROVIDER NOTE - CLINICAL SUMMARY MEDICAL DECISION MAKING FREE TEXT BOX
6 cm laceration left knee cut with a  tetanus up-to-date last tetanus 4 years ago patient laceration was repaired with 4-0 nylon running sutures clindamycin and pain medications plan to discharge the patient

## 2024-01-02 NOTE — ED ADULT NURSE NOTE - NSFALLUNIVINTERV_ED_ALL_ED
Bed/Stretcher in lowest position, wheels locked, appropriate side rails in place/Call bell, personal items and telephone in reach/Instruct patient to call for assistance before getting out of bed/chair/stretcher/Non-slip footwear applied when patient is off stretcher/[place] to call system/Physically safe environment - no spills, clutter or unnecessary equipment/Purposeful proactive rounding/Room/bathroom lighting operational, light cord in reach Bed/Stretcher in lowest position, wheels locked, appropriate side rails in place/Call bell, personal items and telephone in reach/Instruct patient to call for assistance before getting out of bed/chair/stretcher/Non-slip footwear applied when patient is off stretcher/[place] to call system/Physically safe environment - no spills, clutter or unnecessary equipment/Purposeful proactive rounding/Room/bathroom lighting operational, light cord in reach
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
## 2024-01-02 NOTE — ED PROVIDER NOTE - PATIENT PORTAL LINK FT
You can access the FollowMyHealth Patient Portal offered by [facility] by registering at the following website: [email or web address]. By joining [employer]’s FollowMyHealth portal, you will also be able to view your health information using other applications (apps) compatible with our system. You can access the FollowMyHealth Patient Portal offered by [facility] by registering at the following website: [email or web address]. By joining [employer]’s FollowMyHealth portal, you will also be able to view your health information using other applications (apps) compatible with our system.

## 2024-01-02 NOTE — ED PROVIDER NOTE - CARE PROVIDER_API CALL
Vladislav Brown  Orthopaedic Surgery  [address] [place] [address], [address]  [place] [address]  Phone: [phone]  Fax: [phone]  Follow Up Time:    Vladislav Brown  Orthopaedic Surgery  [address] [place] [address], [address]  [place] [address]  Phone: [phone]  Fax: [phone]  Follow Up Time:

## 2024-04-12 ENCOUNTER — EMERGENCY (EMERGENCY)
Facility: HOSPITAL | Age: 39
LOS: 1 days | Discharge: ANOTHER HOSPITAL | End: 2024-04-12
Attending: STUDENT IN AN ORGANIZED HEALTH CARE EDUCATION/TRAINING PROGRAM | Admitting: STUDENT IN AN ORGANIZED HEALTH CARE EDUCATION/TRAINING PROGRAM
Payer: COMMERCIAL

## 2024-04-12 ENCOUNTER — APPOINTMENT (OUTPATIENT)
Dept: FAMILY MEDICINE | Facility: CLINIC | Age: 39
End: 2024-04-12
Payer: COMMERCIAL

## 2024-04-12 VITALS
OXYGEN SATURATION: 98 % | DIASTOLIC BLOOD PRESSURE: 74 MMHG | HEART RATE: 83 BPM | HEIGHT: 68 IN | BODY MASS INDEX: 28.79 KG/M2 | RESPIRATION RATE: 16 BRPM | WEIGHT: 190 LBS | TEMPERATURE: 98.3 F | SYSTOLIC BLOOD PRESSURE: 111 MMHG

## 2024-04-12 VITALS
TEMPERATURE: 98 F | SYSTOLIC BLOOD PRESSURE: 127 MMHG | RESPIRATION RATE: 18 BRPM | HEIGHT: 67 IN | OXYGEN SATURATION: 97 % | WEIGHT: 190.04 LBS | HEART RATE: 81 BPM | DIASTOLIC BLOOD PRESSURE: 88 MMHG

## 2024-04-12 VITALS
OXYGEN SATURATION: 98 % | SYSTOLIC BLOOD PRESSURE: 113 MMHG | DIASTOLIC BLOOD PRESSURE: 76 MMHG | RESPIRATION RATE: 18 BRPM | HEART RATE: 75 BPM

## 2024-04-12 DIAGNOSIS — R00.2 PALPITATIONS: ICD-10-CM

## 2024-04-12 DIAGNOSIS — R91.8 OTHER NONSPECIFIC ABNORMAL FINDING OF LUNG FIELD: ICD-10-CM

## 2024-04-12 DIAGNOSIS — Z98.890 OTHER SPECIFIED POSTPROCEDURAL STATES: Chronic | ICD-10-CM

## 2024-04-12 LAB
ALBUMIN SERPL ELPH-MCNC: 3.9 G/DL — SIGNIFICANT CHANGE UP (ref 3.3–5)
ALP SERPL-CCNC: 46 U/L — SIGNIFICANT CHANGE UP (ref 40–120)
ALT FLD-CCNC: 46 U/L — HIGH (ref 10–45)
ANION GAP SERPL CALC-SCNC: 9 MMOL/L — SIGNIFICANT CHANGE UP (ref 5–17)
APTT BLD: 37 SEC — HIGH (ref 24.5–35.6)
AST SERPL-CCNC: 29 U/L — SIGNIFICANT CHANGE UP (ref 10–40)
BASOPHILS # BLD AUTO: 0.02 K/UL — SIGNIFICANT CHANGE UP (ref 0–0.2)
BASOPHILS NFR BLD AUTO: 0.2 % — SIGNIFICANT CHANGE UP (ref 0–2)
BILIRUB SERPL-MCNC: 0.4 MG/DL — SIGNIFICANT CHANGE UP (ref 0.2–1.2)
BLD GP AB SCN SERPL QL: SIGNIFICANT CHANGE UP
BUN SERPL-MCNC: 17 MG/DL — SIGNIFICANT CHANGE UP (ref 7–23)
CALCIUM SERPL-MCNC: 8.9 MG/DL — SIGNIFICANT CHANGE UP (ref 8.4–10.5)
CHLORIDE SERPL-SCNC: 105 MMOL/L — SIGNIFICANT CHANGE UP (ref 96–108)
CO2 SERPL-SCNC: 26 MMOL/L — SIGNIFICANT CHANGE UP (ref 22–31)
CREAT SERPL-MCNC: 0.67 MG/DL — SIGNIFICANT CHANGE UP (ref 0.5–1.3)
EGFR: 120 ML/MIN/1.73M2 — SIGNIFICANT CHANGE UP
EOSINOPHIL # BLD AUTO: 0.16 K/UL — SIGNIFICANT CHANGE UP (ref 0–0.5)
EOSINOPHIL NFR BLD AUTO: 1.9 % — SIGNIFICANT CHANGE UP (ref 0–6)
GLUCOSE SERPL-MCNC: 90 MG/DL — SIGNIFICANT CHANGE UP (ref 70–99)
HCT VFR BLD CALC: 44.6 % — SIGNIFICANT CHANGE UP (ref 39–50)
HGB BLD-MCNC: 15.5 G/DL — SIGNIFICANT CHANGE UP (ref 13–17)
IMM GRANULOCYTES NFR BLD AUTO: 0.2 % — SIGNIFICANT CHANGE UP (ref 0–0.9)
INR BLD: 0.98 RATIO — SIGNIFICANT CHANGE UP (ref 0.85–1.18)
LYMPHOCYTES # BLD AUTO: 2.96 K/UL — SIGNIFICANT CHANGE UP (ref 1–3.3)
LYMPHOCYTES # BLD AUTO: 35.5 % — SIGNIFICANT CHANGE UP (ref 13–44)
MAGNESIUM SERPL-MCNC: 1.9 MG/DL — SIGNIFICANT CHANGE UP (ref 1.6–2.6)
MCHC RBC-ENTMCNC: 30.4 PG — SIGNIFICANT CHANGE UP (ref 27–34)
MCHC RBC-ENTMCNC: 34.8 GM/DL — SIGNIFICANT CHANGE UP (ref 32–36)
MCV RBC AUTO: 87.5 FL — SIGNIFICANT CHANGE UP (ref 80–100)
MONOCYTES # BLD AUTO: 0.59 K/UL — SIGNIFICANT CHANGE UP (ref 0–0.9)
MONOCYTES NFR BLD AUTO: 7.1 % — SIGNIFICANT CHANGE UP (ref 2–14)
NEUTROPHILS # BLD AUTO: 4.58 K/UL — SIGNIFICANT CHANGE UP (ref 1.8–7.4)
NEUTROPHILS NFR BLD AUTO: 55.1 % — SIGNIFICANT CHANGE UP (ref 43–77)
NRBC # BLD: 0 /100 WBCS — SIGNIFICANT CHANGE UP (ref 0–0)
NT-PROBNP SERPL-SCNC: 55 PG/ML — SIGNIFICANT CHANGE UP (ref 0–300)
PLATELET # BLD AUTO: 285 K/UL — SIGNIFICANT CHANGE UP (ref 150–400)
POTASSIUM SERPL-MCNC: 3.8 MMOL/L — SIGNIFICANT CHANGE UP (ref 3.5–5.3)
POTASSIUM SERPL-SCNC: 3.8 MMOL/L — SIGNIFICANT CHANGE UP (ref 3.5–5.3)
PROT SERPL-MCNC: 7 G/DL — SIGNIFICANT CHANGE UP (ref 6–8.3)
PROTHROM AB SERPL-ACNC: 11.2 SEC — SIGNIFICANT CHANGE UP (ref 9.5–13)
RBC # BLD: 5.1 M/UL — SIGNIFICANT CHANGE UP (ref 4.2–5.8)
RBC # FLD: 11.8 % — SIGNIFICANT CHANGE UP (ref 10.3–14.5)
SODIUM SERPL-SCNC: 140 MMOL/L — SIGNIFICANT CHANGE UP (ref 135–145)
TROPONIN I, HIGH SENSITIVITY RESULT: 353.2 NG/L — HIGH
TROPONIN I, HIGH SENSITIVITY RESULT: 378 NG/L — HIGH
TSH SERPL-MCNC: 2.16 UIU/ML — SIGNIFICANT CHANGE UP (ref 0.36–3.74)
WBC # BLD: 8.33 K/UL — SIGNIFICANT CHANGE UP (ref 3.8–10.5)
WBC # FLD AUTO: 8.33 K/UL — SIGNIFICANT CHANGE UP (ref 3.8–10.5)

## 2024-04-12 PROCEDURE — 86850 RBC ANTIBODY SCREEN: CPT

## 2024-04-12 PROCEDURE — 99215 OFFICE O/P EST HI 40 MIN: CPT

## 2024-04-12 PROCEDURE — 99285 EMERGENCY DEPT VISIT HI MDM: CPT | Mod: 25

## 2024-04-12 PROCEDURE — 71275 CT ANGIOGRAPHY CHEST: CPT | Mod: MC

## 2024-04-12 PROCEDURE — 93005 ELECTROCARDIOGRAM TRACING: CPT

## 2024-04-12 PROCEDURE — 83880 ASSAY OF NATRIURETIC PEPTIDE: CPT

## 2024-04-12 PROCEDURE — 71275 CT ANGIOGRAPHY CHEST: CPT | Mod: 26,MC

## 2024-04-12 PROCEDURE — 85025 COMPLETE CBC W/AUTO DIFF WBC: CPT

## 2024-04-12 PROCEDURE — 36415 COLL VENOUS BLD VENIPUNCTURE: CPT

## 2024-04-12 PROCEDURE — 80053 COMPREHEN METABOLIC PANEL: CPT

## 2024-04-12 PROCEDURE — 84443 ASSAY THYROID STIM HORMONE: CPT

## 2024-04-12 PROCEDURE — 86901 BLOOD TYPING SEROLOGIC RH(D): CPT

## 2024-04-12 PROCEDURE — 83735 ASSAY OF MAGNESIUM: CPT

## 2024-04-12 PROCEDURE — 84484 ASSAY OF TROPONIN QUANT: CPT

## 2024-04-12 PROCEDURE — 96374 THER/PROPH/DIAG INJ IV PUSH: CPT | Mod: XU

## 2024-04-12 PROCEDURE — G2211 COMPLEX E/M VISIT ADD ON: CPT

## 2024-04-12 PROCEDURE — 93010 ELECTROCARDIOGRAM REPORT: CPT

## 2024-04-12 PROCEDURE — 85730 THROMBOPLASTIN TIME PARTIAL: CPT

## 2024-04-12 PROCEDURE — 99285 EMERGENCY DEPT VISIT HI MDM: CPT

## 2024-04-12 PROCEDURE — 86900 BLOOD TYPING SEROLOGIC ABO: CPT

## 2024-04-12 PROCEDURE — 85610 PROTHROMBIN TIME: CPT

## 2024-04-12 RX ORDER — HEPARIN SODIUM 5000 [USP'U]/ML
INJECTION INTRAVENOUS; SUBCUTANEOUS
Qty: 25000 | Refills: 0 | Status: DISCONTINUED | OUTPATIENT
Start: 2024-04-12 | End: 2024-04-15

## 2024-04-12 RX ORDER — HEPARIN SODIUM 5000 [USP'U]/ML
5300 INJECTION INTRAVENOUS; SUBCUTANEOUS EVERY 6 HOURS
Refills: 0 | Status: DISCONTINUED | OUTPATIENT
Start: 2024-04-12 | End: 2024-04-15

## 2024-04-12 RX ORDER — HEPARIN SODIUM 5000 [USP'U]/ML
5000 INJECTION INTRAVENOUS; SUBCUTANEOUS ONCE
Refills: 0 | Status: COMPLETED | OUTPATIENT
Start: 2024-04-12 | End: 2024-04-12

## 2024-04-12 RX ADMIN — HEPARIN SODIUM 1000 UNIT(S)/HR: 5000 INJECTION INTRAVENOUS; SUBCUTANEOUS at 15:15

## 2024-04-12 RX ADMIN — HEPARIN SODIUM 5000 UNIT(S): 5000 INJECTION INTRAVENOUS; SUBCUTANEOUS at 15:14

## 2024-04-12 NOTE — REVIEW OF SYSTEMS
[Chest Pain] : no chest pain [Palpitations] : palpitations [Shortness Of Breath] : shortness of breath [Negative] : Musculoskeletal

## 2024-04-12 NOTE — ED PROVIDER NOTE - OBJECTIVE STATEMENT
39-year-old male history of DVT previously on anticoagulation presents to the ER for palpitations and shortness of breath.  Patient states for the past few days he has been feeling mild shortness of breath that he describes like he has to take a deep breath every few minutes.  He states that with exertion he has been feeling more tired.  He also been feeling palpitations that he describes as noticing his heartbeat.  He spoke with his physician today and was instructed to come to the ER for possibly being in A-fib.  Otherwise denies any fever, chest pain, vomiting, diarrhea, back pain, numbness, blurry vision.

## 2024-04-12 NOTE — ED ADULT NURSE NOTE - OBJECTIVE STATEMENT
Patient came from  with complaint of palpitations and headache since yesterday. Patient denies any recent falls or trauma.

## 2024-04-12 NOTE — ED PROVIDER NOTE - PROGRESS NOTE DETAILS
Reza Johnson ER Attending Patient noted to have elevated troponin with no obvious ST segment changes on EKG.  Patient is requesting to be transferred to [facility] states that his family has long standing relationship with the  of cardiology at this hospital.  Family spoke with Dr. Altaf Helms who is accepting case.  I spoke with the ER team at [facility] Dr. Khan who is aware of case, patient to be transferred ER to ER

## 2024-04-12 NOTE — ED ADULT NURSE NOTE - NSFALLUNIVINTERV_ED_ALL_ED
Bed/Stretcher in lowest position, wheels locked, appropriate side rails in place/Call bell, personal items and telephone in reach/Instruct patient to call for assistance before getting out of bed/chair/stretcher/Non-slip footwear applied when patient is off stretcher/[place] to call system/Physically safe environment - no spills, clutter or unnecessary equipment/Purposeful proactive rounding/Room/bathroom lighting operational, light cord in reach

## 2024-04-12 NOTE — ED PROVIDER NOTE - CLINICAL SUMMARY MEDICAL DECISION MAKING FREE TEXT BOX
Patient presents with atrial fibrillation necessitating a broad workup, concern for underlying PE  DDX: the patient's presentation is not consistent w/ pneumothorax, pneumonia, cardiac tamponade, ACS, thyrotoxicosis.   There is NO evidence of of decompensated heart failure.  - ECG, CXR, CBC, CMP, UA, Troponin, pro-BNP, TSH, Ca-Mg-Phos.  - ECG showing: XXXX  - Defer cardioversion (uncertain historical reliability with time of onset and increased risk of thromboembolic stroke).  - Goal HR < 110.   - CTA PE

## 2024-04-12 NOTE — PHYSICAL EXAM
[Normal Rate] : normal rate  [No Murmur] : no murmur heard [No Joint Swelling] : no joint swelling [Grossly Normal Strength/Tone] : grossly normal strength/tone [Coordination Grossly Intact] : coordination grossly intact [No Focal Deficits] : no focal deficits [Normal] : affect was normal and insight and judgment were intact [de-identified] : irregular rhythm

## 2024-04-26 PROBLEM — R91.8 PULMONARY NODULES: Status: ACTIVE | Noted: 2024-04-26

## 2024-05-04 ENCOUNTER — APPOINTMENT (OUTPATIENT)
Dept: RADIOLOGY | Facility: HOSPITAL | Age: 39
End: 2024-05-04
Payer: COMMERCIAL

## 2024-05-04 ENCOUNTER — OUTPATIENT (OUTPATIENT)
Dept: OUTPATIENT SERVICES | Facility: HOSPITAL | Age: 39
LOS: 1 days | End: 2024-05-04
Payer: COMMERCIAL

## 2024-05-04 DIAGNOSIS — Z98.890 OTHER SPECIFIED POSTPROCEDURAL STATES: Chronic | ICD-10-CM

## 2024-05-04 DIAGNOSIS — Z00.8 ENCOUNTER FOR OTHER GENERAL EXAMINATION: ICD-10-CM

## 2024-05-04 PROCEDURE — 73030 X-RAY EXAM OF SHOULDER: CPT

## 2024-05-04 PROCEDURE — 73030 X-RAY EXAM OF SHOULDER: CPT | Mod: 26,LT

## 2024-07-01 ENCOUNTER — EMERGENCY (EMERGENCY)
Facility: HOSPITAL | Age: 39
LOS: 1 days | Discharge: HOME | End: 2024-07-01
Attending: EMERGENCY MEDICINE | Admitting: EMERGENCY MEDICINE
Payer: COMMERCIAL

## 2024-07-01 VITALS
RESPIRATION RATE: 16 BRPM | SYSTOLIC BLOOD PRESSURE: 123 MMHG | TEMPERATURE: 99 F | HEIGHT: 67 IN | HEART RATE: 70 BPM | WEIGHT: 184.97 LBS | DIASTOLIC BLOOD PRESSURE: 81 MMHG | OXYGEN SATURATION: 97 %

## 2024-07-01 DIAGNOSIS — Z98.890 OTHER SPECIFIED POSTPROCEDURAL STATES: Chronic | ICD-10-CM

## 2024-07-01 PROCEDURE — 73030 X-RAY EXAM OF SHOULDER: CPT | Mod: 26,RT

## 2024-07-01 PROCEDURE — 99283 EMERGENCY DEPT VISIT LOW MDM: CPT

## 2024-07-01 PROCEDURE — 73030 X-RAY EXAM OF SHOULDER: CPT

## 2024-07-01 PROCEDURE — 99284 EMERGENCY DEPT VISIT MOD MDM: CPT

## 2024-07-01 RX ORDER — METOPROLOL TARTRATE 50 MG
0 TABLET ORAL
Refills: 0 | DISCHARGE

## 2024-07-01 RX ORDER — RIVAROXABAN 15 MG-20MG
0 KIT ORAL
Qty: 0 | Refills: 0 | DISCHARGE

## 2024-07-01 RX ADMIN — Medication 600 MILLIGRAM(S): at 16:39

## 2024-07-30 ENCOUNTER — EMERGENCY (EMERGENCY)
Facility: HOSPITAL | Age: 39
LOS: 1 days | Discharge: HOME | End: 2024-07-30
Attending: STUDENT IN AN ORGANIZED HEALTH CARE EDUCATION/TRAINING PROGRAM | Admitting: INTERNAL MEDICINE
Payer: SELF-PAY

## 2024-07-30 VITALS
RESPIRATION RATE: 18 BRPM | TEMPERATURE: 98 F | HEART RATE: 70 BPM | SYSTOLIC BLOOD PRESSURE: 137 MMHG | WEIGHT: 184.97 LBS | OXYGEN SATURATION: 96 % | DIASTOLIC BLOOD PRESSURE: 97 MMHG | HEIGHT: 67 IN

## 2024-07-30 DIAGNOSIS — Z98.890 OTHER SPECIFIED POSTPROCEDURAL STATES: Chronic | ICD-10-CM

## 2024-07-30 PROBLEM — I48.91 UNSPECIFIED ATRIAL FIBRILLATION: Chronic | Status: ACTIVE | Noted: 2024-07-01

## 2024-07-30 PROCEDURE — 73030 X-RAY EXAM OF SHOULDER: CPT | Mod: 26,RT

## 2024-07-30 PROCEDURE — 72125 CT NECK SPINE W/O DYE: CPT | Mod: 26,MC

## 2024-07-30 PROCEDURE — 70450 CT HEAD/BRAIN W/O DYE: CPT | Mod: 26,MC

## 2024-07-30 PROCEDURE — 73030 X-RAY EXAM OF SHOULDER: CPT

## 2024-07-30 PROCEDURE — 99284 EMERGENCY DEPT VISIT MOD MDM: CPT

## 2024-07-30 PROCEDURE — 72125 CT NECK SPINE W/O DYE: CPT | Mod: MC

## 2024-07-30 PROCEDURE — 99284 EMERGENCY DEPT VISIT MOD MDM: CPT | Mod: 25

## 2024-07-30 PROCEDURE — 70450 CT HEAD/BRAIN W/O DYE: CPT | Mod: MC

## 2024-07-30 RX ORDER — ACETAMINOPHEN 325 MG
975 TABLET ORAL ONCE
Refills: 0 | Status: COMPLETED | OUTPATIENT
Start: 2024-07-30 | End: 2024-07-30

## 2024-07-30 RX ADMIN — Medication 975 MILLIGRAM(S): at 16:11

## 2024-07-30 RX ADMIN — Medication 600 MILLIGRAM(S): at 16:11

## 2024-07-30 RX ADMIN — Medication 975 MILLIGRAM(S): at 17:10

## 2024-07-30 RX ADMIN — Medication 600 MILLIGRAM(S): at 17:10

## 2024-07-30 NOTE — ED PROVIDER NOTE - ATTENDING CONTRIBUTION TO CARE
CT head and C-spine negative for acute traumatic pathology, x-ray with chronic shoulder degeneration, patient will follow-up with his orthopedic surgeon, denies any other reported complaints.  Pain is well-controlled, likely mild concussion.
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
## 2024-07-30 NOTE — ED PROVIDER NOTE - PHYSICAL EXAMINATION
Vitals: I have reviewed the patients vital signs  General: Nontoxic appearing  HEENT: Atraumatic, normocephalic, airway patent  Eyes: EOMI, tracking appropriately, PERRL.  Neck: No tracheal deviation  Chest/Lungs: No trauma, symmetric chest rise, speaking in complete sentences,  no resp distress  Heart: Skin and extremities well perfused, regular rate and rhythm  Neuro: A+Ox3, appears non focal. No sensory deficits on neck, right arm, right shoulder, or upper back.  MSK: Posterior neck tender to palpation. Upper  to palpation. Right shoulder nontender to palpation. Limited range of motion of right shoulder due to pain. Patient able to sit up on bed.  Skin: No lesion, bruising, or bleeding on left side of head or posterior neck.    Psych:  Normal mood and affect
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
## 2024-07-30 NOTE — ED PROVIDER NOTE - PATIENT PORTAL LINK FT
You can access the FollowMyHealth Patient Portal offered by [facility] by registering at the following website: [email or web address]. By joining [employer]’s FollowMyHealth portal, you will also be able to view your health information using other applications (apps) compatible with our system.

## 2024-07-30 NOTE — ED ADULT NURSE NOTE - SUICIDE SCREENING QUESTION 2
Pt complains of left leg swelling for several days, has hx of DVT, woke up today with erythema in the left leg No

## 2024-08-31 ENCOUNTER — EMERGENCY (EMERGENCY)
Facility: HOSPITAL | Age: 39
LOS: 1 days | Discharge: HOME | End: 2024-08-31
Attending: STUDENT IN AN ORGANIZED HEALTH CARE EDUCATION/TRAINING PROGRAM | Admitting: STUDENT IN AN ORGANIZED HEALTH CARE EDUCATION/TRAINING PROGRAM
Payer: COMMERCIAL

## 2024-08-31 VITALS
WEIGHT: 184.97 LBS | OXYGEN SATURATION: 100 % | HEIGHT: 67 IN | RESPIRATION RATE: 16 BRPM | SYSTOLIC BLOOD PRESSURE: 133 MMHG | DIASTOLIC BLOOD PRESSURE: 89 MMHG | HEART RATE: 63 BPM | TEMPERATURE: 98 F

## 2024-08-31 DIAGNOSIS — Z98.890 OTHER SPECIFIED POSTPROCEDURAL STATES: Chronic | ICD-10-CM

## 2024-08-31 LAB
ALBUMIN SERPL ELPH-MCNC: 3.6 G/DL — SIGNIFICANT CHANGE UP (ref 3.3–5)
ALP SERPL-CCNC: 79 U/L — SIGNIFICANT CHANGE UP (ref 40–120)
ALT FLD-CCNC: 44 U/L — SIGNIFICANT CHANGE UP (ref 10–45)
ANION GAP SERPL CALC-SCNC: 6 MMOL/L — SIGNIFICANT CHANGE UP (ref 5–17)
AST SERPL-CCNC: 24 U/L — SIGNIFICANT CHANGE UP (ref 10–40)
BASOPHILS # BLD AUTO: 0.02 K/UL — SIGNIFICANT CHANGE UP (ref 0–0.2)
BASOPHILS NFR BLD AUTO: 0.2 % — SIGNIFICANT CHANGE UP (ref 0–2)
BILIRUB SERPL-MCNC: 0.4 MG/DL — SIGNIFICANT CHANGE UP (ref 0.2–1.2)
BUN SERPL-MCNC: 10 MG/DL — SIGNIFICANT CHANGE UP (ref 7–23)
CALCIUM SERPL-MCNC: 9.1 MG/DL — SIGNIFICANT CHANGE UP (ref 8.4–10.5)
CHLORIDE SERPL-SCNC: 103 MMOL/L — SIGNIFICANT CHANGE UP (ref 96–108)
CO2 SERPL-SCNC: 30 MMOL/L — SIGNIFICANT CHANGE UP (ref 22–31)
CREAT SERPL-MCNC: 0.85 MG/DL — SIGNIFICANT CHANGE UP (ref 0.5–1.3)
EGFR: 112 ML/MIN/1.73M2 — SIGNIFICANT CHANGE UP
EOSINOPHIL # BLD AUTO: 0.1 K/UL — SIGNIFICANT CHANGE UP (ref 0–0.5)
EOSINOPHIL NFR BLD AUTO: 1.2 % — SIGNIFICANT CHANGE UP (ref 0–6)
FLUAV AG NPH QL: SIGNIFICANT CHANGE UP
FLUBV AG NPH QL: SIGNIFICANT CHANGE UP
GLUCOSE SERPL-MCNC: 105 MG/DL — HIGH (ref 70–99)
HCT VFR BLD CALC: 44.3 % — SIGNIFICANT CHANGE UP (ref 39–50)
HGB BLD-MCNC: 14.9 G/DL — SIGNIFICANT CHANGE UP (ref 13–17)
IMM GRANULOCYTES NFR BLD AUTO: 0.2 % — SIGNIFICANT CHANGE UP (ref 0–0.9)
LYMPHOCYTES # BLD AUTO: 2.17 K/UL — SIGNIFICANT CHANGE UP (ref 1–3.3)
LYMPHOCYTES # BLD AUTO: 25.9 % — SIGNIFICANT CHANGE UP (ref 13–44)
MCHC RBC-ENTMCNC: 29.8 PG — SIGNIFICANT CHANGE UP (ref 27–34)
MCHC RBC-ENTMCNC: 33.6 GM/DL — SIGNIFICANT CHANGE UP (ref 32–36)
MCV RBC AUTO: 88.6 FL — SIGNIFICANT CHANGE UP (ref 80–100)
MONOCYTES # BLD AUTO: 0.44 K/UL — SIGNIFICANT CHANGE UP (ref 0–0.9)
MONOCYTES NFR BLD AUTO: 5.3 % — SIGNIFICANT CHANGE UP (ref 2–14)
NEUTROPHILS # BLD AUTO: 5.63 K/UL — SIGNIFICANT CHANGE UP (ref 1.8–7.4)
NEUTROPHILS NFR BLD AUTO: 67.2 % — SIGNIFICANT CHANGE UP (ref 43–77)
NRBC # BLD: 0 /100 WBCS — SIGNIFICANT CHANGE UP (ref 0–0)
PLATELET # BLD AUTO: 295 K/UL — SIGNIFICANT CHANGE UP (ref 150–400)
POTASSIUM SERPL-MCNC: 4.2 MMOL/L — SIGNIFICANT CHANGE UP (ref 3.5–5.3)
POTASSIUM SERPL-SCNC: 4.2 MMOL/L — SIGNIFICANT CHANGE UP (ref 3.5–5.3)
PROT SERPL-MCNC: 7.4 G/DL — SIGNIFICANT CHANGE UP (ref 6–8.3)
RBC # BLD: 5 M/UL — SIGNIFICANT CHANGE UP (ref 4.2–5.8)
RBC # FLD: 11.8 % — SIGNIFICANT CHANGE UP (ref 10.3–14.5)
RSV RNA NPH QL NAA+NON-PROBE: SIGNIFICANT CHANGE UP
SARS-COV-2 RNA SPEC QL NAA+PROBE: SIGNIFICANT CHANGE UP
SODIUM SERPL-SCNC: 139 MMOL/L — SIGNIFICANT CHANGE UP (ref 135–145)
WBC # BLD: 8.38 K/UL — SIGNIFICANT CHANGE UP (ref 3.8–10.5)
WBC # FLD AUTO: 8.38 K/UL — SIGNIFICANT CHANGE UP (ref 3.8–10.5)

## 2024-08-31 PROCEDURE — 99284 EMERGENCY DEPT VISIT MOD MDM: CPT | Mod: 25

## 2024-08-31 PROCEDURE — 85025 COMPLETE CBC W/AUTO DIFF WBC: CPT

## 2024-08-31 PROCEDURE — 99284 EMERGENCY DEPT VISIT MOD MDM: CPT

## 2024-08-31 PROCEDURE — 96374 THER/PROPH/DIAG INJ IV PUSH: CPT

## 2024-08-31 PROCEDURE — 87798 DETECT AGENT NOS DNA AMP: CPT

## 2024-08-31 PROCEDURE — 36415 COLL VENOUS BLD VENIPUNCTURE: CPT

## 2024-08-31 PROCEDURE — 87637 SARSCOV2&INF A&B&RSV AMP PRB: CPT

## 2024-08-31 PROCEDURE — 80053 COMPREHEN METABOLIC PANEL: CPT

## 2024-08-31 RX ORDER — KETOROLAC TROMETHAMINE 30 MG/ML
15 INJECTION, SOLUTION INTRAMUSCULAR ONCE
Refills: 0 | Status: DISCONTINUED | OUTPATIENT
Start: 2024-08-31 | End: 2024-08-31

## 2024-08-31 RX ORDER — SODIUM CHLORIDE 9 MG/ML
1000 INJECTION INTRAMUSCULAR; INTRAVENOUS; SUBCUTANEOUS ONCE
Refills: 0 | Status: COMPLETED | OUTPATIENT
Start: 2024-08-31 | End: 2024-08-31

## 2024-08-31 RX ADMIN — SODIUM CHLORIDE 2000 MILLILITER(S): 9 INJECTION INTRAMUSCULAR; INTRAVENOUS; SUBCUTANEOUS at 09:21

## 2024-08-31 RX ADMIN — KETOROLAC TROMETHAMINE 15 MILLIGRAM(S): 30 INJECTION, SOLUTION INTRAMUSCULAR at 09:21

## 2024-08-31 NOTE — ED ADULT TRIAGE NOTE - NSWEIGHTCALCTOOLDRUG_GEN_A_CORE
ANTICOAGULATION MANAGEMENT     Shahram Sitzman 33 year old male is on warfarin with therapeutic result. (Goal Chromogenic Factor X 40-20%)    Recent labs: (last 7 days)     04/05/23  0758   [id number]*       ASSESSMENT     Source(s): Chart Review and Patient/Caregiver Call       Warfarin doses taken: Warfarin taken as instructed    Diet: No new diet changes identified    New illness, injury, or hospitalization: No    Medication/supplement changes: None noted    Signs or symptoms of bleeding or clotting: No    Previous result: Therapeutic last 2(+) visits    Additional findings: None       PLAN     Recommended plan for no diet, medication or health factor changes affecting result    Dosing Instructions: Continue your current warfarin dose 15mg Wed/Sat and 12.5mg ROW with next Chromogenic Factor X in 6 weeks       Telephone call with Tone who verbalizes understanding and agrees to plan    Lab visit scheduled    Education provided:     Goal range and lab monitoring: goal range and significance of current result    Contact [phone] with any changes, questions or concerns.     Plan made per [facility] anticoagulation protocol      
 used

## 2024-08-31 NOTE — ED PROVIDER NOTE - PATIENT PORTAL LINK FT
You can access the FollowMyHealth Patient Portal offered by [facility] by registering at the following website: [email or web address]. By joining [employer]’s FollowMyHealth portal, you will also be able to view your health information using other applications (apps) compatible with our system.

## 2024-08-31 NOTE — ED PROVIDER NOTE - CLINICAL SUMMARY MEDICAL DECISION MAKING FREE TEXT BOX
39-year-old male with no reported significant past medical history presenting to the ED with complaints of body pain reports bilateral hip pain bilateral thigh pain, bilateral shoulder pain, patient denies any reported chest pain or shortness of breath, reports symptoms have been ongoing for 10 days, patient reports hiking and it is not sure if there was tick bites associated.  Patient denies any reported fever chills nausea vomiting no other reported complaints.    Patient reports improvement after IV fluids and Toradol, patient with possible Lyme disease, tick panel sent, labs within normal limits, flu COVID negative, patient would like to defer treatment until confirmatory test, follow-up with primary care physician return precautions discussed verbalized understanding agreeable discharge plan.

## 2024-08-31 NOTE — ED ADULT TRIAGE NOTE - CHIEF COMPLAINT QUOTE
Pt states "for past 2 weeks I have stiff /painful legs, hips and arms".  Pt has history DVT in 2018.  Pt off xarelto.  PCP Harika.

## 2024-08-31 NOTE — ED PROVIDER NOTE - OBJECTIVE STATEMENT
39-year-old male with no reported significant past medical history presenting to the ED with complaints of body pain reports bilateral hip pain bilateral thigh pain, bilateral shoulder pain, patient denies any reported chest pain or shortness of breath, reports symptoms have been ongoing for 10 days, patient reports hiking and it is not sure if there was tick bites associated.  Patient denies any reported fever chills nausea vomiting no other reported complaints.

## 2024-08-31 NOTE — ED PROVIDER NOTE - PHYSICAL EXAMINATION
VITAL SIGNS: I have reviewed nursing notes and confirm.  CONSTITUTIONAL: well-appearing, non-toxic, NAD  SKIN: Warm dry, normal skin turgor  HEAD: NCAT  CARD: RRR, no murmurs, rubs or gallops  RESP: clear to ausculation b/l.  No rales, rhonchi, or wheezing.  ABD: soft, + BS, non-tender, non-distended, no rebound or guarding. No CVA tenderness  EXT: Full ROM, no bony tenderness, no pedal edema, no calf tenderness  NEURO: normal motor. normal sensory.  Normal gait.  PSYCH: Cooperative, appropriate.

## 2024-08-31 NOTE — ED ADULT NURSE NOTE - OBJECTIVE STATEMENT
39 yr old male to ED for complaints of body aches, lower legs and body aches x 2 weeks. Patient with history of DVT's on Xarelto. Patient denies shortness of breath, fevers, chills. Pt. A&Ox4, well-appearing, denies SOB, headache, fever/chills, abdominal pain, n/v/d, chest pain, weakness/fatigue. VSS. COVID swab collected and sent to lab. Safety and comfort provided.

## 2024-09-02 LAB
A PHAGOCYTOPH DNA BLD QL NAA+PROBE: NEGATIVE — SIGNIFICANT CHANGE UP
B MICROTI DNA BLD QL NAA+PROBE: NEGATIVE — SIGNIFICANT CHANGE UP
B MIYAMOTOI GLPQ BLD QL NAA+NON-PROBE: NEGATIVE — SIGNIFICANT CHANGE UP
BABESIA DNA SPEC QL NAA+PROBE: NEGATIVE — SIGNIFICANT CHANGE UP
BABESIA DNA SPEC QL NAA+PROBE: NEGATIVE — SIGNIFICANT CHANGE UP
E CHAFFEENSIS DNA BLD QL NAA+PROBE: NEGATIVE — SIGNIFICANT CHANGE UP
E EWINGII DNA SPEC QL NAA+PROBE: NEGATIVE — SIGNIFICANT CHANGE UP
EHRLICHIA DNA SPEC QL NAA+PROBE: NEGATIVE — SIGNIFICANT CHANGE UP

## 2024-09-16 ENCOUNTER — APPOINTMENT (OUTPATIENT)
Dept: FAMILY MEDICINE | Facility: CLINIC | Age: 39
End: 2024-09-16
Payer: COMMERCIAL

## 2024-09-16 VITALS
SYSTOLIC BLOOD PRESSURE: 110 MMHG | OXYGEN SATURATION: 97 % | BODY MASS INDEX: 27.43 KG/M2 | HEART RATE: 71 BPM | TEMPERATURE: 98.2 F | WEIGHT: 181 LBS | HEIGHT: 68 IN | RESPIRATION RATE: 16 BRPM | DIASTOLIC BLOOD PRESSURE: 74 MMHG

## 2024-09-16 DIAGNOSIS — R91.8 OTHER NONSPECIFIC ABNORMAL FINDING OF LUNG FIELD: ICD-10-CM

## 2024-09-16 DIAGNOSIS — M25.50 PAIN IN UNSPECIFIED JOINT: ICD-10-CM

## 2024-09-16 DIAGNOSIS — E78.5 HYPERLIPIDEMIA, UNSPECIFIED: ICD-10-CM

## 2024-09-16 DIAGNOSIS — Z86.718 PERSONAL HISTORY OF OTHER VENOUS THROMBOSIS AND EMBOLISM: ICD-10-CM

## 2024-09-16 PROCEDURE — G2211 COMPLEX E/M VISIT ADD ON: CPT | Mod: NC

## 2024-09-16 PROCEDURE — 99213 OFFICE O/P EST LOW 20 MIN: CPT

## 2024-09-16 NOTE — REVIEW OF SYSTEMS
[Joint Pain] : joint pain [Negative] : Psychiatric [Fever] : no fever [Chills] : no chills [Fatigue] : no fatigue

## 2024-09-16 NOTE — DATA REVIEWED
[FreeTextEntry1] : Reviewed bloodwork from ER Visit 8/31/24 Tick bourne disease panel  CMP  Flu panel

## 2024-09-16 NOTE — ASSESSMENT
[FreeTextEntry1] : Blood work requisition provided. Will follow up with results.  I am providing continuous care for this patient that includes testing, discussion of options for treatment, and shared decision making with regard to the chosen treatment.

## 2024-09-16 NOTE — PHYSICAL EXAM
[No Respiratory Distress] : no respiratory distress  [No Varicosities] : no varicosities [No Edema] : there was no peripheral edema [No Joint Swelling] : no joint swelling [Grossly Normal Strength/Tone] : grossly normal strength/tone [Coordination Grossly Intact] : coordination grossly intact [No Focal Deficits] : no focal deficits [Normal] : affect was normal and insight and judgment were intact [de-identified] : pain in hips with external rotation b/l

## 2024-09-16 NOTE — HISTORY OF PRESENT ILLNESS
[FreeTextEntry8] : 40 yo male with h/o DVT, opioid use disorder in remission, hyperlipidemia, sleep apnea presents for widespread body pain involving multiple joints. He reported that he woke up one day two weeks ago experiencing pain in the back of his legs, feet, buttocks, and hips, as well as his left shoulder. The pain in his right shoulder was attributed to a previous injury from an occupational accident three months ago, for which he had received a cortisone injection, physical therapy, and pain management with Mobic. The pain had reportedly been severe, waking him up at night. A few days after discontinuing Mobic, he started experiencing widespread body pain, which he initially brushed off, believing that he was simply becoming more aware of other pains in his body. However, the pain failed to resolve, lasting for more than a month, and fluctuated in severity. On particularly bad days, it was so intense that he had to take time off work and would struggle to move in the mornings, necessitating the use of Aleve. The patient sought emergent care at one point, fearing that he could be suffering from a serious condition like Lyme disease or West Nile virus. The pain was primarily located in the arches of his feet, behind his knees, his hips, and both shoulders. The pain was worse behind his left knee, and he found it difficult to lift his legs in the morning due to the pain. His right hip also hurt more intensely. He denied initiating any new exercise routines or engaging in unusual activities prior to the onset of the pain. The patient also reported feeling tired, frustrated, and agitated due to his painful condition that had negatively impacted his mood and general disposition.  [facility] - Dr. Christine Bull

## 2024-09-18 DIAGNOSIS — I48.0 PAROXYSMAL ATRIAL FIBRILLATION: ICD-10-CM

## 2024-09-25 LAB
ANA SER IF-ACNC: NEGATIVE
APPEARANCE: CLEAR
BACTERIA: NEGATIVE /HPF
BILIRUBIN URINE: NEGATIVE
BLOOD URINE: NEGATIVE
CAST: 0 /LPF
CHOLEST SERPL-MCNC: 199 MG/DL
COLOR: YELLOW
CRP SERPL-MCNC: 16 MG/L
EPITHELIAL CELLS: 0 /HPF
ERYTHROCYTE [SEDIMENTATION RATE] IN BLOOD BY WESTERGREN METHOD: 13 MM/HR
GLUCOSE QUALITATIVE U: NEGATIVE MG/DL
HDLC SERPL-MCNC: 51 MG/DL
KETONES URINE: NEGATIVE MG/DL
LDLC SERPL CALC-MCNC: 133 MG/DL
LEUKOCYTE ESTERASE URINE: NEGATIVE
MICROSCOPIC-UA: NORMAL
NITRITE URINE: NEGATIVE
NONHDLC SERPL-MCNC: 147 MG/DL
PH URINE: 6.5
PROTEIN URINE: NEGATIVE MG/DL
RED BLOOD CELLS URINE: 0 /HPF
RHEUMATOID FACT SER QL: <10 IU/ML
SPECIFIC GRAVITY URINE: 1.01
TRIGL SERPL-MCNC: 77 MG/DL
UROBILINOGEN URINE: 0.2 MG/DL
WHITE BLOOD CELLS URINE: 0 /HPF

## 2024-12-09 ENCOUNTER — NON-APPOINTMENT (OUTPATIENT)
Age: 39
End: 2024-12-09

## 2024-12-18 DIAGNOSIS — R91.8 OTHER NONSPECIFIC ABNORMAL FINDING OF LUNG FIELD: ICD-10-CM

## 2024-12-20 ENCOUNTER — APPOINTMENT (OUTPATIENT)
Dept: CT IMAGING | Facility: CLINIC | Age: 39
End: 2024-12-20

## 2025-01-30 ENCOUNTER — APPOINTMENT (OUTPATIENT)
Dept: CT IMAGING | Facility: CLINIC | Age: 40
End: 2025-01-30

## 2025-02-13 ENCOUNTER — NON-APPOINTMENT (OUTPATIENT)
Age: 40
End: 2025-02-13

## 2025-02-20 ENCOUNTER — NON-APPOINTMENT (OUTPATIENT)
Age: 40
End: 2025-02-20

## 2025-03-04 ENCOUNTER — NON-APPOINTMENT (OUTPATIENT)
Age: 40
End: 2025-03-04

## 2025-04-17 ENCOUNTER — APPOINTMENT (OUTPATIENT)
Dept: FAMILY MEDICINE | Facility: CLINIC | Age: 40
End: 2025-04-17
Payer: COMMERCIAL

## 2025-04-17 VITALS
RESPIRATION RATE: 16 BRPM | OXYGEN SATURATION: 99 % | TEMPERATURE: 98.4 F | SYSTOLIC BLOOD PRESSURE: 99 MMHG | DIASTOLIC BLOOD PRESSURE: 65 MMHG | HEART RATE: 65 BPM

## 2025-04-17 DIAGNOSIS — R91.8 OTHER NONSPECIFIC ABNORMAL FINDING OF LUNG FIELD: ICD-10-CM

## 2025-04-17 DIAGNOSIS — I48.0 PAROXYSMAL ATRIAL FIBRILLATION: ICD-10-CM

## 2025-04-17 DIAGNOSIS — E78.5 HYPERLIPIDEMIA, UNSPECIFIED: ICD-10-CM

## 2025-04-17 DIAGNOSIS — G47.33 OBSTRUCTIVE SLEEP APNEA (ADULT) (PEDIATRIC): ICD-10-CM

## 2025-04-17 PROCEDURE — 99213 OFFICE O/P EST LOW 20 MIN: CPT

## 2025-04-17 PROCEDURE — G2211 COMPLEX E/M VISIT ADD ON: CPT | Mod: NC

## 2025-04-17 RX ORDER — RIVAROXABAN 20 MG/1
20 TABLET, FILM COATED ORAL
Qty: 30 | Refills: 0 | Status: ACTIVE | COMMUNITY
Start: 2025-04-17

## 2025-04-18 ENCOUNTER — NON-APPOINTMENT (OUTPATIENT)
Age: 40
End: 2025-04-18

## 2025-04-18 LAB
BASOPHILS # BLD AUTO: 0.02 K/UL
BASOPHILS NFR BLD AUTO: 0.3 %
EOSINOPHIL # BLD AUTO: 0.23 K/UL
EOSINOPHIL NFR BLD AUTO: 3.4 %
HCT VFR BLD CALC: 44.2 %
HGB BLD-MCNC: 15.2 G/DL
IMM GRANULOCYTES NFR BLD AUTO: 0.1 %
LYMPHOCYTES # BLD AUTO: 2.8 K/UL
LYMPHOCYTES NFR BLD AUTO: 41.2 %
MAN DIFF?: NORMAL
MCHC RBC-ENTMCNC: 30.7 PG
MCHC RBC-ENTMCNC: 34.4 G/DL
MCV RBC AUTO: 89.3 FL
MONOCYTES # BLD AUTO: 0.63 K/UL
MONOCYTES NFR BLD AUTO: 9.3 %
NEUTROPHILS # BLD AUTO: 3.11 K/UL
NEUTROPHILS NFR BLD AUTO: 45.7 %
PLATELET # BLD AUTO: 292 K/UL
RBC # BLD: 4.95 M/UL
RBC # FLD: 12.3 %
WBC # FLD AUTO: 6.8 K/UL

## 2025-04-21 LAB
ALBUMIN SERPL ELPH-MCNC: 4.6 G/DL
ALP BLD-CCNC: 63 U/L
ALT SERPL-CCNC: 41 U/L
ANION GAP SERPL CALC-SCNC: 10 MMOL/L
AST SERPL-CCNC: 31 U/L
BILIRUB SERPL-MCNC: 0.4 MG/DL
BUN SERPL-MCNC: 15 MG/DL
CALCIUM SERPL-MCNC: 9.5 MG/DL
CHLORIDE SERPL-SCNC: 105 MMOL/L
CHOLEST SERPL-MCNC: 215 MG/DL
CO2 SERPL-SCNC: 25 MMOL/L
CREAT SERPL-MCNC: 0.92 MG/DL
EGFRCR SERPLBLD CKD-EPI 2021: 107 ML/MIN/1.73M2
GLUCOSE SERPL-MCNC: 77 MG/DL
HDLC SERPL-MCNC: 58 MG/DL
LDLC SERPL-MCNC: 147 MG/DL
NONHDLC SERPL-MCNC: 157 MG/DL
POTASSIUM SERPL-SCNC: 4.9 MMOL/L
PROT SERPL-MCNC: 6.9 G/DL
SODIUM SERPL-SCNC: 139 MMOL/L
TRIGL SERPL-MCNC: 56 MG/DL

## 2025-04-25 DIAGNOSIS — G89.29 PAIN IN RIGHT SHOULDER: ICD-10-CM

## 2025-04-25 DIAGNOSIS — M25.512 PAIN IN RIGHT SHOULDER: ICD-10-CM

## 2025-04-25 DIAGNOSIS — M25.511 PAIN IN RIGHT SHOULDER: ICD-10-CM

## 2025-05-16 ENCOUNTER — APPOINTMENT (OUTPATIENT)
Dept: ORTHOPEDIC SURGERY | Facility: CLINIC | Age: 40
End: 2025-05-16

## 2025-06-12 ENCOUNTER — APPOINTMENT (OUTPATIENT)
Dept: CT IMAGING | Facility: CLINIC | Age: 40
End: 2025-06-12

## 2025-07-29 NOTE — ED ADULT TRIAGE NOTE - PAIN: PRESENCE, MLM
[FreeTextEntry1] : Patient with bilat ear pain after swimming  - has inflammation of both eac - recommended stop neomycin drops and started on strict dry ear precautions - also recommended ofoxacin drops - also has recent pharyngitis - throat culture done - feel likely viral but if culture positive will add abx.  follow up 2 weeks.   left knee/complains of pain/discomfort